# Patient Record
Sex: FEMALE | Race: WHITE | Employment: FULL TIME | URBAN - METROPOLITAN AREA
[De-identification: names, ages, dates, MRNs, and addresses within clinical notes are randomized per-mention and may not be internally consistent; named-entity substitution may affect disease eponyms.]

---

## 2017-01-10 ENCOUNTER — TELEPHONE (OUTPATIENT)
Dept: INTERNAL MEDICINE CLINIC | Facility: CLINIC | Age: 62
End: 2017-01-10

## 2017-01-10 NOTE — TELEPHONE ENCOUNTER
Pt cancelled her physical on 3/20. She is having surgery on that day at [facility]. She said that all the testing and pre-op will be done there. Does  want to see her for a physical before or after the surgery? Pt is aware that  is not here today.

## 2017-01-11 NOTE — TELEPHONE ENCOUNTER
Discussed with patient. She is going to have a knee revision surgery with Dr. Kathleen Jeter at [facility] on March 20. I have recommended that she come in to see me about 2 or 3 weeks at a time so that I can check her and her blood pressure.   I will hold off on blood

## 2017-02-24 ENCOUNTER — TELEPHONE (OUTPATIENT)
Dept: INTERNAL MEDICINE CLINIC | Facility: CLINIC | Age: 62
End: 2017-02-24

## 2017-02-24 NOTE — TELEPHONE ENCOUNTER
ZBIGNIEW Mayorga, pre-procedure RN at [facility] to call back-what surgery will patient have and when is she scheduled to have the surgery. Last EKG is from 3/24/16 and patient is scheduled to see Estefania Trejo on 3/6/17.  Per 1/10/17 encounter-patient having knee re

## 2017-02-24 NOTE — TELEPHONE ENCOUNTER
Collette called back, states patient is having the knee revision surgery on 3/20/2017 at [facility] and all the pre-op testing is ordered by their doctors and done at [facility] but they just need a previous EKG for comparison.  Patients last EKG (3/24/2016) faxed to Col

## 2017-03-06 ENCOUNTER — OFFICE VISIT (OUTPATIENT)
Dept: INTERNAL MEDICINE CLINIC | Facility: CLINIC | Age: 62
End: 2017-03-06

## 2017-03-06 VITALS
SYSTOLIC BLOOD PRESSURE: 126 MMHG | TEMPERATURE: 98 F | HEART RATE: 76 BPM | DIASTOLIC BLOOD PRESSURE: 76 MMHG | BODY MASS INDEX: 21.67 KG/M2 | WEIGHT: 131.63 LBS | HEIGHT: 65.5 IN | OXYGEN SATURATION: 98 %

## 2017-03-06 DIAGNOSIS — Z96.652 HISTORY OF TOTAL LEFT KNEE REPLACEMENT: ICD-10-CM

## 2017-03-06 DIAGNOSIS — M85.88 OSTEOPENIA OF SPINE: ICD-10-CM

## 2017-03-06 DIAGNOSIS — G47.30 SLEEP APNEA, UNSPECIFIED TYPE: ICD-10-CM

## 2017-03-06 DIAGNOSIS — M15.9 PRIMARY OSTEOARTHRITIS INVOLVING MULTIPLE JOINTS: ICD-10-CM

## 2017-03-06 DIAGNOSIS — E78.9 BORDERLINE HIGH CHOLESTEROL: ICD-10-CM

## 2017-03-06 DIAGNOSIS — I10 ESSENTIAL HYPERTENSION: Primary | ICD-10-CM

## 2017-03-06 PROCEDURE — 99214 OFFICE O/P EST MOD 30 MIN: CPT | Performed by: INTERNAL MEDICINE

## 2017-03-06 PROCEDURE — 99212 OFFICE O/P EST SF 10 MIN: CPT | Performed by: INTERNAL MEDICINE

## 2017-03-06 RX ORDER — DIFLORASONE DIACETATE 0.5 MG/G
CREAM TOPICAL 3 TIMES DAILY
COMMUNITY
End: 2017-09-07

## 2017-03-06 RX ORDER — SODIUM PHOSPHATE,MONO-DIBASIC 19G-7G/118
1 ENEMA (ML) RECTAL 2 TIMES DAILY
COMMUNITY

## 2017-03-06 NOTE — PATIENT INSTRUCTIONS
1.  Patient is to continue her current diet, medications and activity. 2.  Patient appears to be doing well medically and should be able to tolerate her proposed surgery.   3.  Patient will have her physician at [facility] send me copies of he

## 2017-03-06 NOTE — PROGRESS NOTES
Homer Huerta is a 57year old female. Patient presents with: Follow - Up  Hypertension  Arthritis    HPI:   Patient presents with: Follow - Up  Hypertension  Arthritis    Pt feels well.   Pt is going to have a left knee revision with Dr Renée Rodriguez in the near otherwise  RESPIRATORY:No cough or SOB  CARDIOVASCULAR: No chest pain  GI: No abdominal pain, nausea, vomiting, diarrhea, or constipation  :No Urinary complaints  EXT:No complaints of pain or swelling in patient's legs    EXAM:   /76 mmHg  Pulse 76 The patient notes that her left knee has become unstable. She has been told that the plastic component of the knee has worn out. Patient will have a revision of her left total knee replacement with , at [facility], on March 20.       The patient indicat

## 2017-07-19 RX ORDER — DILTIAZEM HYDROCHLORIDE 240 MG/1
CAPSULE, COATED, EXTENDED RELEASE ORAL
Qty: 90 CAPSULE | Refills: 3 | Status: SHIPPED | OUTPATIENT
Start: 2017-07-19 | End: 2018-03-15 | Stop reason: ALTCHOICE

## 2017-07-19 RX ORDER — TRIAMTERENE AND HYDROCHLOROTHIAZIDE 37.5; 25 MG/1; MG/1
CAPSULE ORAL
Qty: 90 CAPSULE | Refills: 3 | Status: SHIPPED | OUTPATIENT
Start: 2017-07-19 | End: 2018-11-14

## 2017-07-19 RX ORDER — LOSARTAN POTASSIUM 50 MG/1
TABLET ORAL
Qty: 90 TABLET | Refills: 3 | Status: SHIPPED | OUTPATIENT
Start: 2017-07-19 | End: 2018-08-09

## 2017-08-31 ENCOUNTER — APPOINTMENT (OUTPATIENT)
Dept: LAB | Age: 62
End: 2017-08-31
Attending: INTERNAL MEDICINE
Payer: COMMERCIAL

## 2017-08-31 PROCEDURE — 80061 LIPID PANEL: CPT | Performed by: INTERNAL MEDICINE

## 2017-08-31 PROCEDURE — 80053 COMPREHEN METABOLIC PANEL: CPT | Performed by: INTERNAL MEDICINE

## 2017-08-31 PROCEDURE — 85025 COMPLETE CBC W/AUTO DIFF WBC: CPT | Performed by: INTERNAL MEDICINE

## 2017-08-31 PROCEDURE — 84443 ASSAY THYROID STIM HORMONE: CPT | Performed by: INTERNAL MEDICINE

## 2017-08-31 PROCEDURE — 82306 VITAMIN D 25 HYDROXY: CPT | Performed by: INTERNAL MEDICINE

## 2017-08-31 PROCEDURE — 81001 URINALYSIS AUTO W/SCOPE: CPT | Performed by: INTERNAL MEDICINE

## 2017-09-01 ENCOUNTER — TELEPHONE (OUTPATIENT)
Dept: INTERNAL MEDICINE CLINIC | Facility: CLINIC | Age: 62
End: 2017-09-01

## 2017-09-01 NOTE — TELEPHONE ENCOUNTER
To Dr. Kati Singh - would you please reivew pt's u/a? Spoke with pt who denies all UTI sx: fever/chills/dysuria/urgency/pressure, etc.  Pt states she does have frequency but this is long-standing.

## 2017-09-07 ENCOUNTER — OFFICE VISIT (OUTPATIENT)
Dept: INTERNAL MEDICINE CLINIC | Facility: CLINIC | Age: 62
End: 2017-09-07

## 2017-09-07 VITALS
HEIGHT: 65.5 IN | WEIGHT: 130 LBS | TEMPERATURE: 98 F | HEART RATE: 80 BPM | RESPIRATION RATE: 18 BRPM | SYSTOLIC BLOOD PRESSURE: 124 MMHG | BODY MASS INDEX: 21.4 KG/M2 | DIASTOLIC BLOOD PRESSURE: 80 MMHG | OXYGEN SATURATION: 97 %

## 2017-09-07 DIAGNOSIS — N28.9 RENAL INSUFFICIENCY: ICD-10-CM

## 2017-09-07 DIAGNOSIS — E78.9 BORDERLINE HIGH CHOLESTEROL: ICD-10-CM

## 2017-09-07 DIAGNOSIS — M15.9 PRIMARY OSTEOARTHRITIS INVOLVING MULTIPLE JOINTS: ICD-10-CM

## 2017-09-07 DIAGNOSIS — I10 ESSENTIAL HYPERTENSION: ICD-10-CM

## 2017-09-07 DIAGNOSIS — M85.88 OSTEOPENIA OF SPINE: ICD-10-CM

## 2017-09-07 DIAGNOSIS — G47.30 SLEEP APNEA, UNSPECIFIED TYPE: ICD-10-CM

## 2017-09-07 DIAGNOSIS — Z00.00 ANNUAL PHYSICAL EXAM: Primary | ICD-10-CM

## 2017-09-07 DIAGNOSIS — Z96.652 HISTORY OF TOTAL LEFT KNEE REPLACEMENT: ICD-10-CM

## 2017-09-07 PROCEDURE — 99396 PREV VISIT EST AGE 40-64: CPT | Performed by: INTERNAL MEDICINE

## 2017-09-07 PROCEDURE — 99212 OFFICE O/P EST SF 10 MIN: CPT | Performed by: INTERNAL MEDICINE

## 2017-09-07 PROCEDURE — 99214 OFFICE O/P EST MOD 30 MIN: CPT | Performed by: INTERNAL MEDICINE

## 2017-09-07 RX ORDER — CIPROFLOXACIN 500 MG/1
500 TABLET, FILM COATED ORAL 2 TIMES DAILY
Qty: 20 TABLET | Refills: 0 | Status: SHIPPED | OUTPATIENT
Start: 2017-09-07 | End: 2017-09-17

## 2017-09-07 RX ORDER — PANTOPRAZOLE SODIUM 40 MG/1
40 TABLET, DELAYED RELEASE ORAL
COMMUNITY
Start: 2017-03-21 | End: 2017-09-07

## 2017-09-07 RX ORDER — ASPIRIN 325 MG
325 TABLET, DELAYED RELEASE (ENTERIC COATED) ORAL
COMMUNITY
Start: 2017-03-20 | End: 2017-09-07

## 2017-09-07 RX ORDER — LOSARTAN POTASSIUM 50 MG/1
TABLET ORAL
COMMUNITY
Start: 2016-01-16 | End: 2017-09-07

## 2017-09-07 RX ORDER — TRIAMTERENE AND HYDROCHLOROTHIAZIDE 37.5; 25 MG/1; MG/1
CAPSULE ORAL
COMMUNITY
Start: 2016-01-16 | End: 2017-09-07

## 2017-09-07 RX ORDER — DOCUSATE SODIUM 100 MG/1
100 CAPSULE, LIQUID FILLED ORAL
COMMUNITY
Start: 2017-03-20 | End: 2017-09-07

## 2017-09-07 RX ORDER — DIFLORASONE DIACETATE 0.5 MG/G
1 OINTMENT TOPICAL
COMMUNITY
End: 2018-03-15 | Stop reason: ALTCHOICE

## 2017-09-07 RX ORDER — HYDROCODONE BITARTRATE AND ACETAMINOPHEN 10; 325 MG/1; MG/1
1-2 TABLET ORAL
COMMUNITY
Start: 2017-03-20 | End: 2017-09-07

## 2017-09-07 RX ORDER — TOBRAMYCIN 3 MG/ML
SOLUTION/ DROPS OPHTHALMIC
COMMUNITY
Start: 2015-12-20 | End: 2017-09-07

## 2017-09-07 RX ORDER — DILTIAZEM HYDROCHLORIDE 240 MG/1
CAPSULE, COATED, EXTENDED RELEASE ORAL
COMMUNITY
Start: 2016-01-16 | End: 2017-09-07

## 2017-09-07 NOTE — PATIENT INSTRUCTIONS
1.  Patient is to continue her current diet, medications and activity. 2.  I will plan to see the patient back in 6 months with blood tests which include a BMP, lipid panel, AST and ALT.   3.  I have sent a prescription for Cipro for the patient and also a

## 2017-09-07 NOTE — PROGRESS NOTES
HPI:   Anabel Hernandez is a 57year old female who presents for a complete physical exam.  Mrs. Stephanie Cui is a 75-year-old white female who was seen by me on September 7, 2017 for her annual physical examination. At the time of examination Mrs. Godoy Rashid was Product (PROBIOTIC & ACIDOPHILUS EX ST OR) Take 1 tablet by mouth 2 (two) times daily. Disp:  Rfl:    Coenzyme Q10 (COQ10 OR) Take 1 tablet by mouth daily.    Disp:  Rfl:    Calcium Carb-Cholecalciferol ([employer] OYSTER SHELL CALCIUM+VIT D) 500-125 MG-UNIT Oral well developed, well nourished,in no apparent distress  SKIN: no rashes,no suspicious lesions  HEENT: atraumatic, normocephalic, normal oropharynx, normal TM's  EYES:PERRLA, EOMI,conjunctiva are clear, sclerae are nonicteric  NECK: supple, no cervical or s Micah Calix or at her pharmacy. 2. Essential hypertension  Doing well.  CPM.  As above.     3. Primary osteoarthritis involving multiple joints  Doing well.  CPM.  Patient is done well with her recent revision of her left total knee r

## 2017-09-26 ENCOUNTER — TELEPHONE (OUTPATIENT)
Dept: INTERNAL MEDICINE CLINIC | Facility: CLINIC | Age: 62
End: 2017-09-26

## 2017-09-26 NOTE — TELEPHONE ENCOUNTER
Patient will be travelling for about one month   Would like to take written RX/s with her for her 3 blood pressure medications   Leaves on Friday 9/29 and requests to  on Thurs 9.28    Please call pt when ready [phone]    Message 1 of 2 - Vincenzo Lee

## 2017-11-06 ENCOUNTER — TELEPHONE (OUTPATIENT)
Dept: INTERNAL MEDICINE CLINIC | Facility: CLINIC | Age: 62
End: 2017-11-06

## 2017-11-06 NOTE — TELEPHONE ENCOUNTER
[facility] is requesting a prescription for Triamterene - HCTZ 37.5-25 mg tablets. Capsules are on backorder.      Tasked to rx

## 2017-12-08 ENCOUNTER — TELEPHONE (OUTPATIENT)
Dept: INTERNAL MEDICINE CLINIC | Facility: CLINIC | Age: 62
End: 2017-12-08

## 2017-12-08 NOTE — TELEPHONE ENCOUNTER
Pt. Is calling with a medication question regarding Diltiazem, she is currently taking 240 mg, she is signing up for Medicare and they don't cover that dosage. She wants to know if the dosage could be changed to a dosage that would be covered.  Ph. # [phone]

## 2017-12-12 NOTE — TELEPHONE ENCOUNTER
Tatyana with patient. Her Medicare drug plan will not cover the dosage of the diltiazem that she is currently on which is 240 mg.  I told the patient will consider lowering her dose is 180 mg which is covered.   She will let me know if she needs the [facility]

## 2018-02-26 ENCOUNTER — OFFICE VISIT (OUTPATIENT)
Dept: DERMATOLOGY CLINIC | Facility: CLINIC | Age: 63
End: 2018-02-26

## 2018-02-26 DIAGNOSIS — L30.9 DERMATITIS: Primary | ICD-10-CM

## 2018-02-26 DIAGNOSIS — D23.60 BENIGN NEOPLASM OF SKIN OF UPPER LIMB, INCLUDING SHOULDER, UNSPECIFIED LATERALITY: ICD-10-CM

## 2018-02-26 DIAGNOSIS — D23.5 BENIGN NEOPLASM OF SKIN OF TRUNK, EXCEPT SCROTUM: ICD-10-CM

## 2018-02-26 DIAGNOSIS — D23.30 BENIGN NEOPLASM OF SKIN OF FACE: ICD-10-CM

## 2018-02-26 DIAGNOSIS — D23.70 BENIGN NEOPLASM OF SKIN OF LOWER LIMB, INCLUDING HIP, UNSPECIFIED LATERALITY: ICD-10-CM

## 2018-02-26 DIAGNOSIS — D23.4 BENIGN NEOPLASM OF SCALP AND SKIN OF NECK: ICD-10-CM

## 2018-02-26 PROCEDURE — G0463 HOSPITAL OUTPT CLINIC VISIT: HCPCS | Performed by: DERMATOLOGY

## 2018-02-26 PROCEDURE — 99202 OFFICE O/P NEW SF 15 MIN: CPT | Performed by: DERMATOLOGY

## 2018-02-26 RX ORDER — TRIAMCINOLONE ACETONIDE 5 MG/G
CREAM TOPICAL
Qty: 60 G | Refills: 1 | Status: SHIPPED | OUTPATIENT
Start: 2018-02-26 | End: 2022-02-04

## 2018-03-11 NOTE — PROGRESS NOTES
Saravanan Gordillo is a 70year old female. HPI:     CC:  Patient presents with:  Lesion: Pt presenting today with dry crusty patches to face and right shin that are itchy and tends to get reallt red and irriated at times.  Pt denies a personal/family hx of SC. Take 1 tablet by mouth daily. Disp:  Rfl:    Calcium Carb-Cholecalciferol ([employer] OYSTER SHELL CALCIUM+VIT D) 500-125 MG-UNIT Oral Tab Take 1 tablet by mouth 2 (two) times daily. Disp:  Rfl:    Aspirin (ASPIR-81 OR) Take 1 tablet by mouth daily.    Disp: complaints. History, medications, allergies reviewed as noted. Physical Examination:     Well-developed well-nourished patient alert oriented in no acute distress.   Exam total-body performed, including scalp, head, neck, face,nails, hair, exte next several weeks. Await clinical response to above therapies. Multiple lentigos keratoses reassurance no suspicious lesions. No significant changes  Please refer to map for specific lesions. See additional diagnoses.   Pros cons of various therapie

## 2018-03-14 ENCOUNTER — APPOINTMENT (OUTPATIENT)
Dept: LAB | Age: 63
End: 2018-03-14
Attending: INTERNAL MEDICINE
Payer: MEDICARE

## 2018-03-14 ENCOUNTER — TELEPHONE (OUTPATIENT)
Dept: INTERNAL MEDICINE CLINIC | Facility: CLINIC | Age: 63
End: 2018-03-14

## 2018-03-14 DIAGNOSIS — E78.5 HYPERLIPIDEMIA, UNSPECIFIED HYPERLIPIDEMIA TYPE: ICD-10-CM

## 2018-03-14 DIAGNOSIS — I10 HYPERTENSION, UNSPECIFIED TYPE: ICD-10-CM

## 2018-03-14 DIAGNOSIS — I10 HYPERTENSION, UNSPECIFIED TYPE: Primary | ICD-10-CM

## 2018-03-14 LAB
ALT SERPL-CCNC: 14 U/L (ref 14–54)
ANION GAP SERPL CALC-SCNC: 8 MMOL/L (ref 0–18)
AST SERPL-CCNC: 19 U/L (ref 15–41)
BUN SERPL-MCNC: 16 MG/DL (ref 8–20)
BUN/CREAT SERPL: 18.4 (ref 10–20)
CALCIUM SERPL-MCNC: 9.4 MG/DL (ref 8.5–10.5)
CHLORIDE SERPL-SCNC: 104 MMOL/L (ref 95–110)
CHOLEST SERPL-MCNC: 188 MG/DL (ref 110–200)
CO2 SERPL-SCNC: 26 MMOL/L (ref 22–32)
CREAT SERPL-MCNC: 0.87 MG/DL (ref 0.5–1.5)
GLUCOSE SERPL-MCNC: 92 MG/DL (ref 70–99)
HDLC SERPL-MCNC: 57 MG/DL
LDLC SERPL CALC-MCNC: 115 MG/DL (ref 0–99)
NONHDLC SERPL-MCNC: 131 MG/DL
OSMOLALITY UR CALC.SUM OF ELEC: 287 MOSM/KG (ref 275–295)
POTASSIUM SERPL-SCNC: 3.3 MMOL/L (ref 3.3–5.1)
SODIUM SERPL-SCNC: 138 MMOL/L (ref 136–144)
TRIGL SERPL-MCNC: 82 MG/DL (ref 1–149)

## 2018-03-14 PROCEDURE — 36415 COLL VENOUS BLD VENIPUNCTURE: CPT

## 2018-03-14 PROCEDURE — 80061 LIPID PANEL: CPT

## 2018-03-14 PROCEDURE — 80048 BASIC METABOLIC PNL TOTAL CA: CPT

## 2018-03-14 PROCEDURE — 84460 ALANINE AMINO (ALT) (SGPT): CPT

## 2018-03-14 PROCEDURE — 84450 TRANSFERASE (AST) (SGOT): CPT

## 2018-03-15 ENCOUNTER — OFFICE VISIT (OUTPATIENT)
Dept: INTERNAL MEDICINE CLINIC | Facility: CLINIC | Age: 63
End: 2018-03-15

## 2018-03-15 VITALS
WEIGHT: 134.63 LBS | HEIGHT: 65.5 IN | DIASTOLIC BLOOD PRESSURE: 86 MMHG | SYSTOLIC BLOOD PRESSURE: 140 MMHG | HEART RATE: 80 BPM | OXYGEN SATURATION: 98 % | BODY MASS INDEX: 22.16 KG/M2 | TEMPERATURE: 98 F

## 2018-03-15 DIAGNOSIS — M15.9 PRIMARY OSTEOARTHRITIS INVOLVING MULTIPLE JOINTS: ICD-10-CM

## 2018-03-15 DIAGNOSIS — E78.9 BORDERLINE HIGH CHOLESTEROL: ICD-10-CM

## 2018-03-15 DIAGNOSIS — N28.9 RENAL INSUFFICIENCY: ICD-10-CM

## 2018-03-15 DIAGNOSIS — M85.88 OSTEOPENIA OF SPINE: ICD-10-CM

## 2018-03-15 DIAGNOSIS — Z96.652 HISTORY OF TOTAL LEFT KNEE REPLACEMENT: ICD-10-CM

## 2018-03-15 DIAGNOSIS — I10 ESSENTIAL HYPERTENSION: Primary | ICD-10-CM

## 2018-03-15 DIAGNOSIS — G47.30 SLEEP APNEA, UNSPECIFIED TYPE: ICD-10-CM

## 2018-03-15 PROCEDURE — G0463 HOSPITAL OUTPT CLINIC VISIT: HCPCS | Performed by: INTERNAL MEDICINE

## 2018-03-15 PROCEDURE — 99214 OFFICE O/P EST MOD 30 MIN: CPT | Performed by: INTERNAL MEDICINE

## 2018-03-15 RX ORDER — AMLODIPINE BESYLATE 5 MG/1
5 TABLET ORAL DAILY
Qty: 90 TABLET | Refills: 3 | Status: SHIPPED | OUTPATIENT
Start: 2018-03-15 | End: 2019-04-24

## 2018-03-15 NOTE — PATIENT INSTRUCTIONS
1.  Patient is to continue her current diet, medication and activity. 2.  I will stop the patient's Cardizem 240 mg orally daily and switch her to Norvasc/amlodipine 5 mg orally daily.   3.  I will plan see the patient back in about 2 months to check to se

## 2018-03-15 NOTE — PROGRESS NOTES
Henry Daugherty is a 70year old female. Patient presents with:  Checkup: 6 month. gyne from [facility] ordered dexa scan and mammogram-scheduled for april 2. Medication Request: States medicare won't pay for current dose of diltiazem. New new dose for coverage. nodule 2005   • Osteoarthrosis, unspecified whether generalized or localized, unspecified site 2004   • Osteopenia 2003   • Unspecified essential hypertension       Social History:  Smoking status: Never Smoker revision last year. Patient been bothered by pain in her right knee. She is considering having surgery on this at some point in the future. Currently she and her  are planning to go on a bike trip in September.   She is not planning of surgery chapo

## 2018-04-02 ENCOUNTER — HOSPITAL ENCOUNTER (OUTPATIENT)
Dept: MAMMOGRAPHY | Facility: HOSPITAL | Age: 63
Discharge: HOME | End: 2018-04-02
Attending: OBSTETRICS & GYNECOLOGY
Payer: MEDICARE

## 2018-04-02 ENCOUNTER — HOSPITAL ENCOUNTER (OUTPATIENT)
Dept: BONE DENSITY | Facility: HOSPITAL | Age: 63
Discharge: HOME | End: 2018-04-02
Attending: OBSTETRICS & GYNECOLOGY
Payer: MEDICARE

## 2018-04-02 DIAGNOSIS — Z12.39 SCREENING FOR MALIGNANT NEOPLASM OF BREAST: ICD-10-CM

## 2018-04-02 DIAGNOSIS — M81.0 AGE-RELATED OSTEOPOROSIS WITHOUT CURRENT PATHOLOGICAL FRACTURE: ICD-10-CM

## 2018-04-02 PROCEDURE — 77067 SCR MAMMO BI INCL CAD: CPT | Performed by: OBSTETRICS & GYNECOLOGY

## 2018-04-02 PROCEDURE — 77080 DXA BONE DENSITY AXIAL: CPT | Performed by: OBSTETRICS & GYNECOLOGY

## 2018-04-10 ENCOUNTER — TELEPHONE (OUTPATIENT)
Dept: INTERNAL MEDICINE CLINIC | Facility: CLINIC | Age: 63
End: 2018-04-10

## 2018-04-10 NOTE — TELEPHONE ENCOUNTER
Please call pt and notify her that her recent Mammogram has turned out well. No sign of cancer was noted. I will route this to nursing.   Thank you!!

## 2018-06-24 ENCOUNTER — TELEPHONE (OUTPATIENT)
Dept: INTERNAL MEDICINE CLINIC | Facility: CLINIC | Age: 63
End: 2018-06-24

## 2018-06-24 DIAGNOSIS — R53.83 FATIGUE, UNSPECIFIED TYPE: ICD-10-CM

## 2018-06-24 DIAGNOSIS — E55.9 VITAMIN D DEFICIENCY: ICD-10-CM

## 2018-06-24 DIAGNOSIS — I10 ESSENTIAL HYPERTENSION: ICD-10-CM

## 2018-06-24 DIAGNOSIS — E78.9 BORDERLINE HIGH CHOLESTEROL: ICD-10-CM

## 2018-06-24 DIAGNOSIS — Z00.00 ANNUAL PHYSICAL EXAM: Primary | ICD-10-CM

## 2018-06-24 NOTE — TELEPHONE ENCOUNTER
Noted. Patient was here to see me last week but had to leave before I could see her. Patient will be due back to see me in September for her annual physical examination.   Please call the patient and notify her that I have placed orders for blood tests an

## 2018-06-25 NOTE — TELEPHONE ENCOUNTER
Left voicemail per HIPAA relaying the message along with instructions to fast for 12 hours prior to blood draw. Encouraged the patient to call back with any questions or concerns. Nothing further at this time.

## 2018-08-09 RX ORDER — LOSARTAN POTASSIUM 50 MG/1
TABLET ORAL
Qty: 90 TABLET | Refills: 2 | Status: SHIPPED | OUTPATIENT
Start: 2018-08-09 | End: 2019-05-08

## 2018-11-14 RX ORDER — TRIAMTERENE AND HYDROCHLOROTHIAZIDE 37.5; 25 MG/1; MG/1
CAPSULE ORAL
Qty: 90 CAPSULE | Refills: 0 | Status: SHIPPED | OUTPATIENT
Start: 2018-11-14 | End: 2019-02-09

## 2018-12-19 ENCOUNTER — LAB ENCOUNTER (OUTPATIENT)
Dept: LAB | Age: 63
End: 2018-12-19
Attending: INTERNAL MEDICINE
Payer: MEDICARE

## 2018-12-19 DIAGNOSIS — E55.9 VITAMIN D DEFICIENCY: ICD-10-CM

## 2018-12-19 DIAGNOSIS — E78.9 BORDERLINE HIGH CHOLESTEROL: ICD-10-CM

## 2018-12-19 DIAGNOSIS — Z00.00 ANNUAL PHYSICAL EXAM: ICD-10-CM

## 2018-12-19 DIAGNOSIS — R53.83 FATIGUE, UNSPECIFIED TYPE: ICD-10-CM

## 2018-12-19 PROCEDURE — 85025 COMPLETE CBC W/AUTO DIFF WBC: CPT

## 2018-12-19 PROCEDURE — 36415 COLL VENOUS BLD VENIPUNCTURE: CPT

## 2018-12-19 PROCEDURE — 82306 VITAMIN D 25 HYDROXY: CPT

## 2018-12-19 PROCEDURE — 81001 URINALYSIS AUTO W/SCOPE: CPT

## 2018-12-19 PROCEDURE — 80061 LIPID PANEL: CPT

## 2018-12-19 PROCEDURE — 84443 ASSAY THYROID STIM HORMONE: CPT

## 2018-12-19 PROCEDURE — 80053 COMPREHEN METABOLIC PANEL: CPT

## 2018-12-21 ENCOUNTER — OFFICE VISIT (OUTPATIENT)
Dept: INTERNAL MEDICINE CLINIC | Facility: CLINIC | Age: 63
End: 2018-12-21
Payer: MEDICARE

## 2018-12-21 VITALS
OXYGEN SATURATION: 98 % | WEIGHT: 136 LBS | HEIGHT: 65.5 IN | BODY MASS INDEX: 22.39 KG/M2 | DIASTOLIC BLOOD PRESSURE: 76 MMHG | TEMPERATURE: 98 F | SYSTOLIC BLOOD PRESSURE: 116 MMHG | HEART RATE: 80 BPM

## 2018-12-21 DIAGNOSIS — I10 ESSENTIAL HYPERTENSION: Primary | ICD-10-CM

## 2018-12-21 DIAGNOSIS — N28.9 RENAL INSUFFICIENCY: ICD-10-CM

## 2018-12-21 DIAGNOSIS — E78.9 BORDERLINE HIGH CHOLESTEROL: ICD-10-CM

## 2018-12-21 DIAGNOSIS — Z96.652 HISTORY OF TOTAL LEFT KNEE REPLACEMENT: ICD-10-CM

## 2018-12-21 DIAGNOSIS — M15.9 PRIMARY OSTEOARTHRITIS INVOLVING MULTIPLE JOINTS: ICD-10-CM

## 2018-12-21 DIAGNOSIS — G47.30 SLEEP APNEA, UNSPECIFIED TYPE: ICD-10-CM

## 2018-12-21 DIAGNOSIS — M85.88 OSTEOPENIA OF SPINE: ICD-10-CM

## 2018-12-21 PROCEDURE — 90670 PCV13 VACCINE IM: CPT | Performed by: INTERNAL MEDICINE

## 2018-12-21 PROCEDURE — G0463 HOSPITAL OUTPT CLINIC VISIT: HCPCS | Performed by: INTERNAL MEDICINE

## 2018-12-21 PROCEDURE — G0009 ADMIN PNEUMOCOCCAL VACCINE: HCPCS | Performed by: INTERNAL MEDICINE

## 2018-12-21 PROCEDURE — 99214 OFFICE O/P EST MOD 30 MIN: CPT | Performed by: INTERNAL MEDICINE

## 2018-12-21 RX ORDER — ALENDRONATE SODIUM 70 MG/1
1 TABLET ORAL WEEKLY
Refills: 3 | COMMUNITY
Start: 2018-10-21

## 2018-12-21 RX ORDER — AMOXICILLIN 500 MG/1
CAPSULE ORAL
Qty: 4 CAPSULE | Refills: 3 | Status: ON HOLD | OUTPATIENT
Start: 2018-12-21 | End: 2019-11-12

## 2018-12-21 NOTE — PATIENT INSTRUCTIONS
1.  Patient is to continue her current diet, medication and activity. 2.  I will plan to see the patient back in 6 months with blood tests which will include BMP, lipid panel, AST and ALT. This will actually be her annual physical examination.   We will d

## 2018-12-21 NOTE — PROGRESS NOTES
Annika Kaplan is a 70year old female. Patient presents with:  Checkup: Feels well. She would like a script for antibiotics prior to next dental appointment. Hypertension  Arthritis    HPI:   Patient presents with:  Checkup: Feels well.  She would like a (NORVASC) 5 MG Oral Tab Take 1 tablet (5 mg total) by mouth daily.  Disp: 90 tablet Rfl: 3   triamcinolone acetonide 0.5 % External Cream Use bid as directed Disp: 60 g Rfl: 1   Glucosamine-Chondroitin 500-400 MG Oral Cap Take 1 tablet by mouth 2 (two) time breast masses are noted. LUNGS: clear to auscultation  CARDIO: RRR, normal S1S2, without murmur   GI:Protuberant, BS are present, no organomegaly or palpable masses  EXTREMITIES: no edema. All peripheral pulses are intact.   Patient has good range of lorrie Patient's BUN is 16, creatinine is 0.94 and GFR is greater than 60. Patient's renal function is now normal.      The patient indicates understanding of these issues and agrees to the plan.   The patient is asked to return in 6 months with blood tests and u

## 2019-02-10 RX ORDER — TRIAMTERENE AND HYDROCHLOROTHIAZIDE 37.5; 25 MG/1; MG/1
CAPSULE ORAL
Qty: 90 CAPSULE | Refills: 1 | Status: SHIPPED | OUTPATIENT
Start: 2019-02-10 | End: 2019-08-14

## 2019-04-24 RX ORDER — AMLODIPINE BESYLATE 5 MG/1
5 TABLET ORAL DAILY
Qty: 90 TABLET | Refills: 3 | Status: SHIPPED | OUTPATIENT
Start: 2019-04-24 | End: 2020-05-01

## 2019-05-08 RX ORDER — LOSARTAN POTASSIUM 50 MG/1
50 TABLET ORAL DAILY
Qty: 90 TABLET | Refills: 3 | Status: SHIPPED | OUTPATIENT
Start: 2019-05-08 | End: 2020-06-08

## 2019-05-20 ENCOUNTER — TELEPHONE (OUTPATIENT)
Dept: INTERNAL MEDICINE CLINIC | Facility: CLINIC | Age: 64
End: 2019-05-20

## 2019-05-20 NOTE — TELEPHONE ENCOUNTER
Patient  Senia Nicole calling asking if patient should get measles vaccine.     Best number to call back at [phone]

## 2019-05-21 NOTE — TELEPHONE ENCOUNTER
Phone call to patient and patient's  and message left. People grow up in the 46s most likely had the measles as was very contagious at that time. It would be very hard to get to adulthood from that time without having had the measles.   If they fel

## 2019-06-24 ENCOUNTER — TELEPHONE (OUTPATIENT)
Dept: INTERNAL MEDICINE CLINIC | Facility: CLINIC | Age: 64
End: 2019-06-24

## 2019-06-24 DIAGNOSIS — R35.0 URINARY FREQUENCY: Primary | ICD-10-CM

## 2019-06-24 NOTE — TELEPHONE ENCOUNTER
[facility]-----    Spoke to pt who states she has urinary frequency and urgency.      UA/CS ordered per MD protocol

## 2019-06-25 ENCOUNTER — APPOINTMENT (OUTPATIENT)
Dept: LAB | Age: 64
End: 2019-06-25
Attending: INTERNAL MEDICINE
Payer: MEDICARE

## 2019-06-25 DIAGNOSIS — E78.9 BORDERLINE HIGH CHOLESTEROL: ICD-10-CM

## 2019-06-25 DIAGNOSIS — I10 ESSENTIAL HYPERTENSION: ICD-10-CM

## 2019-06-25 DIAGNOSIS — R35.0 URINARY FREQUENCY: ICD-10-CM

## 2019-06-25 DIAGNOSIS — N28.9 RENAL INSUFFICIENCY: ICD-10-CM

## 2019-06-25 PROCEDURE — 84450 TRANSFERASE (AST) (SGOT): CPT

## 2019-06-25 PROCEDURE — 84460 ALANINE AMINO (ALT) (SGPT): CPT

## 2019-06-25 PROCEDURE — 80061 LIPID PANEL: CPT

## 2019-06-25 PROCEDURE — 80048 BASIC METABOLIC PNL TOTAL CA: CPT

## 2019-06-25 PROCEDURE — 81003 URINALYSIS AUTO W/O SCOPE: CPT

## 2019-06-25 PROCEDURE — 36415 COLL VENOUS BLD VENIPUNCTURE: CPT

## 2019-06-27 ENCOUNTER — OFFICE VISIT (OUTPATIENT)
Dept: INTERNAL MEDICINE CLINIC | Facility: CLINIC | Age: 64
End: 2019-06-27
Payer: MEDICARE

## 2019-06-27 VITALS
HEART RATE: 84 BPM | OXYGEN SATURATION: 99 % | HEIGHT: 65.5 IN | RESPIRATION RATE: 16 BRPM | WEIGHT: 138 LBS | TEMPERATURE: 98 F | BODY MASS INDEX: 22.72 KG/M2 | SYSTOLIC BLOOD PRESSURE: 130 MMHG | DIASTOLIC BLOOD PRESSURE: 80 MMHG

## 2019-06-27 DIAGNOSIS — M15.9 PRIMARY OSTEOARTHRITIS INVOLVING MULTIPLE JOINTS: ICD-10-CM

## 2019-06-27 DIAGNOSIS — M85.88 OSTEOPENIA OF SPINE: ICD-10-CM

## 2019-06-27 DIAGNOSIS — J01.90 ACUTE NON-RECURRENT SINUSITIS, UNSPECIFIED LOCATION: ICD-10-CM

## 2019-06-27 DIAGNOSIS — J40 BRONCHITIS: ICD-10-CM

## 2019-06-27 DIAGNOSIS — Z96.652 HISTORY OF TOTAL LEFT KNEE REPLACEMENT: ICD-10-CM

## 2019-06-27 DIAGNOSIS — Z00.00 ANNUAL PHYSICAL EXAM: Primary | ICD-10-CM

## 2019-06-27 DIAGNOSIS — N28.9 RENAL INSUFFICIENCY: ICD-10-CM

## 2019-06-27 DIAGNOSIS — I10 ESSENTIAL HYPERTENSION: ICD-10-CM

## 2019-06-27 DIAGNOSIS — E78.9 BORDERLINE HIGH CHOLESTEROL: ICD-10-CM

## 2019-06-27 DIAGNOSIS — N18.30 STAGE 3 CHRONIC KIDNEY DISEASE (HCC): ICD-10-CM

## 2019-06-27 PROCEDURE — 93010 ELECTROCARDIOGRAM REPORT: CPT | Performed by: INTERNAL MEDICINE

## 2019-06-27 PROCEDURE — G0463 HOSPITAL OUTPT CLINIC VISIT: HCPCS | Performed by: INTERNAL MEDICINE

## 2019-06-27 PROCEDURE — 99214 OFFICE O/P EST MOD 30 MIN: CPT | Performed by: INTERNAL MEDICINE

## 2019-06-27 PROCEDURE — G0439 PPPS, SUBSEQ VISIT: HCPCS | Performed by: INTERNAL MEDICINE

## 2019-06-27 PROCEDURE — 93005 ELECTROCARDIOGRAM TRACING: CPT | Performed by: INTERNAL MEDICINE

## 2019-06-27 RX ORDER — AZITHROMYCIN 250 MG/1
TABLET, FILM COATED ORAL
Qty: 6 TABLET | Refills: 1 | Status: SHIPPED | OUTPATIENT
Start: 2019-06-27 | End: 2019-10-16 | Stop reason: ALTCHOICE

## 2019-06-27 NOTE — PROGRESS NOTES
HPI:   Markus Eaton is a 75year old female who was seen by me on June 27, 2019 for her Medicare annual physical examination. At the time of examination Mrs. Jim George was feeling well. She notes that she has had a cough for the past 2 weeks.   She describ CALCIUM+VIT D) 500-125 MG-UNIT Oral Tab Take 1 tablet by mouth 2 (two) times daily. Disp:  Rfl:    Aspirin (ASPIR-81 OR) Take 1 tablet by mouth daily.    Disp:  Rfl:    amoxicillin 500 MG Oral Cap 500 mg  #4   Sig-take 4 tablets (2 g) orally 1 hour prior GENERAL: well developed, well nourished,in no apparent distress  SKIN: no rashes,no suspicious lesions  HEENT: atraumatic, normocephalic, normal oropharynx, normal TM's  EYES:PERRLA, EOMI,conjunctiva are clear, sclerae are nonicteric  NECK: supple, no CPM.    5. Borderline high cholesterol  Well.  CPM.  Patient's recent lipid panel had a cholesterol 191, triglycerides were 143, HDL cholesterol is 55 and LDL cholesterol was 107. Patient's AST was 17 and ALT was 22. CPM.    - LIPID PANEL;  Future  - AST Hearing Problems?: Yes(hearing aids)     Functional Status     Hearing Problems?: Yes(hearing aids)    Vision Problems? : Yes(contacts)    Difficulty walking?: No    Difficulty dressing or bathing?: No    Problems wit Dexascan Every two years Last Dexa Scan:   XR DEXA BONE DENSITOMETRY (CPT=77080) 04/02/2018    No flowsheet data found.    Pap and Pelvic      Pap: Every 3 yrs age 21-65 or Pap+HPV every 5 yrs age 33-67, age 72 and older at high risk   There are no preventi Value   06/25/2019 107 (H)     LDL-CHOLESTEROL (mg/dL (calc))   Date Value   01/16/2012 123    No flowsheet data found. Dilated Eye exam  Annually No flowsheet data found. No flowsheet data found.     COPD      Spirometry Testing Annually No results fo

## 2019-06-27 NOTE — PATIENT INSTRUCTIONS
1.  Patient is to continue her current diet, medication and activity. 2.  I will place the patient on Zithromax for her current cough and bronchitis. 3.  Patient may use Robitussin or Mucinex for the cough.   4.  I will plan to see the patient back in 3 m

## 2019-07-25 ENCOUNTER — HOSPITAL ENCOUNTER (OUTPATIENT)
Dept: MAMMOGRAPHY | Age: 64
Discharge: HOME | End: 2019-07-25
Attending: OBSTETRICS & GYNECOLOGY
Payer: MEDICARE

## 2019-07-25 DIAGNOSIS — Z12.31 ENCOUNTER FOR SCREENING MAMMOGRAM FOR MALIGNANT NEOPLASM OF BREAST: ICD-10-CM

## 2019-07-25 PROCEDURE — 77067 SCR MAMMO BI INCL CAD: CPT | Performed by: OBSTETRICS & GYNECOLOGY

## 2019-07-25 PROCEDURE — 77063 BREAST TOMOSYNTHESIS BI: CPT | Performed by: OBSTETRICS & GYNECOLOGY

## 2019-08-14 RX ORDER — TRIAMTERENE AND HYDROCHLOROTHIAZIDE 37.5; 25 MG/1; MG/1
CAPSULE ORAL
Qty: 90 CAPSULE | Refills: 3 | Status: SHIPPED | OUTPATIENT
Start: 2019-08-14 | End: 2020-08-23

## 2019-10-16 ENCOUNTER — OFFICE VISIT (OUTPATIENT)
Dept: INTERNAL MEDICINE CLINIC | Facility: CLINIC | Age: 64
End: 2019-10-16
Payer: MEDICARE

## 2019-10-16 VITALS
HEIGHT: 66 IN | BODY MASS INDEX: 23.3 KG/M2 | WEIGHT: 145 LBS | DIASTOLIC BLOOD PRESSURE: 76 MMHG | SYSTOLIC BLOOD PRESSURE: 126 MMHG | TEMPERATURE: 98 F | HEART RATE: 80 BPM | OXYGEN SATURATION: 99 %

## 2019-10-16 DIAGNOSIS — M85.88 OSTEOPENIA OF SPINE: ICD-10-CM

## 2019-10-16 DIAGNOSIS — N18.30 STAGE 3 CHRONIC KIDNEY DISEASE (HCC): ICD-10-CM

## 2019-10-16 DIAGNOSIS — M15.9 PRIMARY OSTEOARTHRITIS INVOLVING MULTIPLE JOINTS: ICD-10-CM

## 2019-10-16 DIAGNOSIS — Z01.818 PREOPERATIVE GENERAL PHYSICAL EXAMINATION: Primary | ICD-10-CM

## 2019-10-16 DIAGNOSIS — G47.30 SLEEP APNEA, UNSPECIFIED TYPE: ICD-10-CM

## 2019-10-16 DIAGNOSIS — I10 ESSENTIAL HYPERTENSION: ICD-10-CM

## 2019-10-16 DIAGNOSIS — E78.9 BORDERLINE HIGH CHOLESTEROL: ICD-10-CM

## 2019-10-16 DIAGNOSIS — R53.83 FATIGUE, UNSPECIFIED TYPE: ICD-10-CM

## 2019-10-16 DIAGNOSIS — M17.11 PRIMARY OSTEOARTHRITIS OF RIGHT KNEE: ICD-10-CM

## 2019-10-16 DIAGNOSIS — Z96.652 HISTORY OF TOTAL LEFT KNEE REPLACEMENT: ICD-10-CM

## 2019-10-16 DIAGNOSIS — Z01.818 PREOPERATIVE CLEARANCE: ICD-10-CM

## 2019-10-16 PROCEDURE — G0463 HOSPITAL OUTPT CLINIC VISIT: HCPCS | Performed by: INTERNAL MEDICINE

## 2019-10-16 PROCEDURE — 99215 OFFICE O/P EST HI 40 MIN: CPT | Performed by: INTERNAL MEDICINE

## 2019-10-16 PROCEDURE — 93010 ELECTROCARDIOGRAM REPORT: CPT | Performed by: INTERNAL MEDICINE

## 2019-10-16 PROCEDURE — 93005 ELECTROCARDIOGRAM TRACING: CPT | Performed by: INTERNAL MEDICINE

## 2019-10-16 NOTE — PATIENT INSTRUCTIONS
1.  Patient is to continue her current diet, medication and activity. 2.  Patient appears stable medically today and should be able tolerate proposed surgery. 3.  I have placed orders in the system for preoperative lab work as requested by Dr. Chey Mcmullen.   4

## 2019-10-17 NOTE — PROGRESS NOTES
HPI:   Aurea Granado is a 75year old female who was seen by me today for a preoperative history and physical examination and medical clearance examination prior to undergoing a right total knee replacement with Dr. Oumou Ho on November 11, 2019.   Patient (PROBIOTIC & ACIDOPHILUS EX ST OR), Take 1 tablet by mouth 2 (two) times daily. , Disp: , Rfl:   Coenzyme Q10 (COQ10 OR), Take 1 tablet by mouth daily.   , Disp: , Rfl:   Calcium Carb-Cholecalciferol ([employer] OYSTER SHELL CALCIUM+VIT D) 500-125 MG-UNIT Oral Tab, 6\" (1.676 m)   Wt 145 lb (65.8 kg)   LMP  (LMP Unknown)   SpO2 99%   BMI 23.40 kg/m²     GENERAL: well developed, well nourished,in no apparent distress  SKIN: no rashes,no suspicious lesions  HEENT: atraumatic, normocephalic, normal oropharynx, normal TM COMP METABOLIC PANEL (14); Future  - CBC WITH DIFFERENTIAL WITH PLATELET; Future  - MSSA AND MRSA CULTURE SCREEN; Future  - XR CHEST AP/PA (1 VIEW) (CPT=71045); Future  - URINALYSIS WITH CULTURE REFLEX    3.  Essential hypertension  Patient's blood pressure

## 2019-10-18 ENCOUNTER — LAB ENCOUNTER (OUTPATIENT)
Dept: LAB | Age: 64
End: 2019-10-18
Attending: INTERNAL MEDICINE
Payer: MEDICARE

## 2019-10-18 ENCOUNTER — HOSPITAL ENCOUNTER (OUTPATIENT)
Dept: GENERAL RADIOLOGY | Age: 64
Discharge: HOME | End: 2019-10-18
Attending: INTERNAL MEDICINE
Payer: MEDICARE

## 2019-10-18 DIAGNOSIS — I10 ESSENTIAL HYPERTENSION: ICD-10-CM

## 2019-10-18 DIAGNOSIS — E78.9 BORDERLINE HIGH CHOLESTEROL: ICD-10-CM

## 2019-10-18 DIAGNOSIS — Z01.818 PREOPERATIVE CLEARANCE: ICD-10-CM

## 2019-10-18 DIAGNOSIS — N18.30 STAGE 3 CHRONIC KIDNEY DISEASE (HCC): ICD-10-CM

## 2019-10-18 DIAGNOSIS — R53.83 FATIGUE, UNSPECIFIED TYPE: ICD-10-CM

## 2019-10-18 PROCEDURE — 71045 X-RAY EXAM CHEST 1 VIEW: CPT | Performed by: INTERNAL MEDICINE

## 2019-10-18 PROCEDURE — 87081 CULTURE SCREEN ONLY: CPT

## 2019-10-18 PROCEDURE — 80053 COMPREHEN METABOLIC PANEL: CPT

## 2019-10-18 PROCEDURE — 81003 URINALYSIS AUTO W/O SCOPE: CPT | Performed by: INTERNAL MEDICINE

## 2019-10-18 PROCEDURE — 80061 LIPID PANEL: CPT

## 2019-10-18 PROCEDURE — 85025 COMPLETE CBC W/AUTO DIFF WBC: CPT

## 2019-10-18 PROCEDURE — 36415 COLL VENOUS BLD VENIPUNCTURE: CPT

## 2019-10-25 ENCOUNTER — TELEPHONE (OUTPATIENT)
Dept: INTERNAL MEDICINE CLINIC | Facility: CLINIC | Age: 64
End: 2019-10-25

## 2019-10-25 NOTE — TELEPHONE ENCOUNTER
I had called [facility] previously to ask for alternate fax #. Per instructions from Dr. Klever Armendariz- fax clearance to both [phone] & [phone]. Faxed successfully to [phone], multiple fails to [phone].      Faxed to number below per Dorothy's

## 2019-10-25 NOTE — TELEPHONE ENCOUNTER
Dorothy from [employer] called back with a fax.  # [phone] for clearance routed to clinical

## 2019-11-05 ENCOUNTER — LAB ENCOUNTER (OUTPATIENT)
Dept: LAB | Age: 64
End: 2019-11-05
Attending: ORTHOPAEDIC SURGERY
Payer: MEDICARE

## 2019-11-05 DIAGNOSIS — Z01.818 PRE-OP TESTING: ICD-10-CM

## 2019-11-05 PROCEDURE — 86850 RBC ANTIBODY SCREEN: CPT

## 2019-11-05 PROCEDURE — 86901 BLOOD TYPING SEROLOGIC RH(D): CPT

## 2019-11-05 PROCEDURE — 36415 COLL VENOUS BLD VENIPUNCTURE: CPT

## 2019-11-05 PROCEDURE — 86900 BLOOD TYPING SEROLOGIC ABO: CPT

## 2019-11-05 NOTE — DISCHARGE SUMMARY
Ortho Discharge Summary     Patient ID:  Nelson Burns  [id number]  45 year old  1/10/1955      Admit Date: 11/11/2019 11:24 AM    Discharge Date and Time: 11/12/2019 12:37 PM      Attending Physician: Wagner Zurita MD     Reason for admission: right kn

## 2019-11-11 ENCOUNTER — APPOINTMENT (OUTPATIENT)
Dept: GENERAL RADIOLOGY | Facility: HOSPITAL | Age: 64
End: 2019-11-11
Attending: PHYSICIAN ASSISTANT
Payer: MEDICARE

## 2019-11-11 ENCOUNTER — ANESTHESIA (OUTPATIENT)
Dept: SURGERY | Facility: HOSPITAL | Age: 64
End: 2019-11-11
Payer: MEDICARE

## 2019-11-11 ENCOUNTER — ANESTHESIA EVENT (OUTPATIENT)
Dept: SURGERY | Facility: HOSPITAL | Age: 64
End: 2019-11-11
Payer: MEDICARE

## 2019-11-11 ENCOUNTER — HOSPITAL ENCOUNTER (OUTPATIENT)
Facility: HOSPITAL | Age: 64
Discharge: HOME | End: 2019-11-12
Attending: ORTHOPAEDIC SURGERY | Admitting: ORTHOPAEDIC SURGERY
Payer: MEDICARE

## 2019-11-11 DIAGNOSIS — M17.11 PRIMARY OSTEOARTHRITIS OF RIGHT KNEE: ICD-10-CM

## 2019-11-11 DIAGNOSIS — Z01.818 PRE-OP TESTING: Primary | ICD-10-CM

## 2019-11-11 PROBLEM — Z96.659 S/P TOTAL KNEE ARTHROPLASTY: Status: ACTIVE | Noted: 2019-11-11

## 2019-11-11 PROCEDURE — 0SRC0J9 REPLACEMENT OF RIGHT KNEE JOINT WITH SYNTHETIC SUBSTITUTE, CEMENTED, OPEN APPROACH: ICD-10-PCS | Performed by: ORTHOPAEDIC SURGERY

## 2019-11-11 PROCEDURE — 73560 X-RAY EXAM OF KNEE 1 OR 2: CPT | Performed by: PHYSICIAN ASSISTANT

## 2019-11-11 DEVICE — TKA CAP, PRIMARY W/O STEM EXT: Type: IMPLANTABLE DEVICE | Status: FUNCTIONAL

## 2019-11-11 DEVICE — BP TIB 5- STRL KNEE R NPOR DJO: Type: IMPLANTABLE DEVICE | Site: KNEE | Status: FUNCTIONAL

## 2019-11-11 DEVICE — DOME PAT 35MM 9MM STRL 3 PG: Type: IMPLANTABLE DEVICE | Site: KNEE | Status: FUNCTIONAL

## 2019-11-11 DEVICE — CEM BN NLTX STRL REUSE MED VSC: Type: IMPLANTABLE DEVICE | Site: KNEE | Status: FUNCTIONAL

## 2019-11-11 DEVICE — IMPLANTABLE DEVICE: Type: IMPLANTABLE DEVICE | Status: FUNCTIONAL

## 2019-11-11 DEVICE — COMP FEM 5 STRL KNEE R NPOR: Type: IMPLANTABLE DEVICE | Site: KNEE | Status: FUNCTIONAL

## 2019-11-11 RX ORDER — FAMOTIDINE 20 MG/1
20 TABLET ORAL ONCE
Status: DISCONTINUED | OUTPATIENT
Start: 2019-11-11 | End: 2019-11-11 | Stop reason: HOSPADM

## 2019-11-11 RX ORDER — GABAPENTIN 100 MG/1
100 CAPSULE ORAL 3 TIMES DAILY
Status: DISCONTINUED | OUTPATIENT
Start: 2019-11-11 | End: 2019-11-12

## 2019-11-11 RX ORDER — HYDROMORPHONE HYDROCHLORIDE 1 MG/ML
0.8 INJECTION, SOLUTION INTRAMUSCULAR; INTRAVENOUS; SUBCUTANEOUS EVERY 2 HOUR PRN
Status: DISCONTINUED | OUTPATIENT
Start: 2019-11-11 | End: 2019-11-12

## 2019-11-11 RX ORDER — SODIUM CHLORIDE, SODIUM LACTATE, POTASSIUM CHLORIDE, CALCIUM CHLORIDE 600; 310; 30; 20 MG/100ML; MG/100ML; MG/100ML; MG/100ML
INJECTION, SOLUTION INTRAVENOUS CONTINUOUS
Status: DISCONTINUED | OUTPATIENT
Start: 2019-11-11 | End: 2019-11-12

## 2019-11-11 RX ORDER — HYDROMORPHONE HYDROCHLORIDE 1 MG/ML
0.4 INJECTION, SOLUTION INTRAMUSCULAR; INTRAVENOUS; SUBCUTANEOUS EVERY 5 MIN PRN
Status: DISCONTINUED | OUTPATIENT
Start: 2019-11-11 | End: 2019-11-11 | Stop reason: HOSPADM

## 2019-11-11 RX ORDER — NALOXONE HYDROCHLORIDE 0.4 MG/ML
80 INJECTION, SOLUTION INTRAMUSCULAR; INTRAVENOUS; SUBCUTANEOUS AS NEEDED
Status: DISCONTINUED | OUTPATIENT
Start: 2019-11-11 | End: 2019-11-11 | Stop reason: HOSPADM

## 2019-11-11 RX ORDER — SODIUM CHLORIDE, SODIUM LACTATE, POTASSIUM CHLORIDE, CALCIUM CHLORIDE 600; 310; 30; 20 MG/100ML; MG/100ML; MG/100ML; MG/100ML
INJECTION, SOLUTION INTRAVENOUS CONTINUOUS
Status: DISCONTINUED | OUTPATIENT
Start: 2019-11-11 | End: 2019-11-11 | Stop reason: HOSPADM

## 2019-11-11 RX ORDER — PANTOPRAZOLE SODIUM 40 MG/1
40 TABLET, DELAYED RELEASE ORAL
Qty: 30 TABLET | Refills: 0 | Status: SHIPPED | OUTPATIENT
Start: 2019-11-11 | End: 2020-01-09

## 2019-11-11 RX ORDER — HYDROMORPHONE HYDROCHLORIDE 1 MG/ML
0.2 INJECTION, SOLUTION INTRAMUSCULAR; INTRAVENOUS; SUBCUTANEOUS EVERY 5 MIN PRN
Status: DISCONTINUED | OUTPATIENT
Start: 2019-11-11 | End: 2019-11-11 | Stop reason: HOSPADM

## 2019-11-11 RX ORDER — HYDROMORPHONE HYDROCHLORIDE 1 MG/ML
0.6 INJECTION, SOLUTION INTRAMUSCULAR; INTRAVENOUS; SUBCUTANEOUS EVERY 5 MIN PRN
Status: DISCONTINUED | OUTPATIENT
Start: 2019-11-11 | End: 2019-11-11 | Stop reason: HOSPADM

## 2019-11-11 RX ORDER — ACETAMINOPHEN 325 MG/1
650 TABLET ORAL EVERY 6 HOURS SCHEDULED
Status: DISCONTINUED | OUTPATIENT
Start: 2019-11-11 | End: 2019-11-12

## 2019-11-11 RX ORDER — DOCUSATE SODIUM 100 MG/1
100 CAPSULE, LIQUID FILLED ORAL 2 TIMES DAILY
Qty: 60 CAPSULE | Refills: 1 | Status: SHIPPED | OUTPATIENT
Start: 2019-11-11 | End: 2020-01-09 | Stop reason: ALTCHOICE

## 2019-11-11 RX ORDER — METOCLOPRAMIDE HYDROCHLORIDE 5 MG/ML
10 INJECTION INTRAMUSCULAR; INTRAVENOUS EVERY 6 HOURS PRN
Status: DISCONTINUED | OUTPATIENT
Start: 2019-11-11 | End: 2019-11-12

## 2019-11-11 RX ORDER — FAMOTIDINE 20 MG/1
20 TABLET ORAL 2 TIMES DAILY
Status: DISCONTINUED | OUTPATIENT
Start: 2019-11-11 | End: 2019-11-12

## 2019-11-11 RX ORDER — HYDROMORPHONE HYDROCHLORIDE 1 MG/ML
0.4 INJECTION, SOLUTION INTRAMUSCULAR; INTRAVENOUS; SUBCUTANEOUS EVERY 2 HOUR PRN
Status: DISCONTINUED | OUTPATIENT
Start: 2019-11-11 | End: 2019-11-12

## 2019-11-11 RX ORDER — METOCLOPRAMIDE 10 MG/1
10 TABLET ORAL ONCE
Status: DISCONTINUED | OUTPATIENT
Start: 2019-11-11 | End: 2019-11-11 | Stop reason: HOSPADM

## 2019-11-11 RX ORDER — SCOLOPAMINE TRANSDERMAL SYSTEM 1 MG/1
1 PATCH, EXTENDED RELEASE TRANSDERMAL ONCE
Status: DISCONTINUED | OUTPATIENT
Start: 2019-11-11 | End: 2019-11-12

## 2019-11-11 RX ORDER — DIPHENHYDRAMINE HCL 25 MG
25 CAPSULE ORAL EVERY 4 HOURS PRN
Status: DISCONTINUED | OUTPATIENT
Start: 2019-11-11 | End: 2019-11-12

## 2019-11-11 RX ORDER — HALOPERIDOL 5 MG/ML
0.25 INJECTION INTRAMUSCULAR ONCE AS NEEDED
Status: DISCONTINUED | OUTPATIENT
Start: 2019-11-11 | End: 2019-11-11 | Stop reason: HOSPADM

## 2019-11-11 RX ORDER — SODIUM CHLORIDE 0.9 % (FLUSH) 0.9 %
10 SYRINGE (ML) INJECTION AS NEEDED
Status: DISCONTINUED | OUTPATIENT
Start: 2019-11-11 | End: 2019-11-12

## 2019-11-11 RX ORDER — GABAPENTIN 100 MG/1
100 CAPSULE ORAL 3 TIMES DAILY
Qty: 90 CAPSULE | Refills: 0 | Status: SHIPPED | OUTPATIENT
Start: 2019-11-11 | End: 2020-01-09 | Stop reason: ALTCHOICE

## 2019-11-11 RX ORDER — BUPIVACAINE HYDROCHLORIDE 7.5 MG/ML
INJECTION, SOLUTION INTRASPINAL AS NEEDED
Status: DISCONTINUED | OUTPATIENT
Start: 2019-11-11 | End: 2019-11-11 | Stop reason: SURG

## 2019-11-11 RX ORDER — MAGNESIUM HYDROXIDE 1200 MG/15ML
LIQUID ORAL CONTINUOUS PRN
Status: COMPLETED | OUTPATIENT
Start: 2019-11-11 | End: 2019-11-11

## 2019-11-11 RX ORDER — HYDROCODONE BITARTRATE AND ACETAMINOPHEN 5; 325 MG/1; MG/1
2 TABLET ORAL AS NEEDED
Status: DISCONTINUED | OUTPATIENT
Start: 2019-11-11 | End: 2019-11-11 | Stop reason: HOSPADM

## 2019-11-11 RX ORDER — BISACODYL 10 MG
10 SUPPOSITORY, RECTAL RECTAL
Status: DISCONTINUED | OUTPATIENT
Start: 2019-11-11 | End: 2019-11-12

## 2019-11-11 RX ORDER — TRAMADOL HYDROCHLORIDE 50 MG/1
TABLET ORAL EVERY 6 HOURS PRN
Qty: 60 TABLET | Refills: 0 | Status: SHIPPED | OUTPATIENT
Start: 2019-11-11 | End: 2020-01-09 | Stop reason: ALTCHOICE

## 2019-11-11 RX ORDER — HYDROMORPHONE HYDROCHLORIDE 1 MG/ML
1.2 INJECTION, SOLUTION INTRAMUSCULAR; INTRAVENOUS; SUBCUTANEOUS EVERY 2 HOUR PRN
Status: DISCONTINUED | OUTPATIENT
Start: 2019-11-11 | End: 2019-11-12

## 2019-11-11 RX ORDER — DOCUSATE SODIUM 100 MG/1
100 CAPSULE, LIQUID FILLED ORAL 2 TIMES DAILY
Status: DISCONTINUED | OUTPATIENT
Start: 2019-11-11 | End: 2019-11-12

## 2019-11-11 RX ORDER — CYCLOBENZAPRINE HCL 5 MG
5 TABLET ORAL EVERY 8 HOURS PRN
Status: DISCONTINUED | OUTPATIENT
Start: 2019-11-11 | End: 2019-11-12

## 2019-11-11 RX ORDER — MIDAZOLAM HYDROCHLORIDE 1 MG/ML
INJECTION INTRAMUSCULAR; INTRAVENOUS AS NEEDED
Status: DISCONTINUED | OUTPATIENT
Start: 2019-11-11 | End: 2019-11-11 | Stop reason: SURG

## 2019-11-11 RX ORDER — ACETAMINOPHEN 500 MG
1000 TABLET ORAL ONCE
Status: COMPLETED | OUTPATIENT
Start: 2019-11-11 | End: 2019-11-11

## 2019-11-11 RX ORDER — HYDROCODONE BITARTRATE AND ACETAMINOPHEN 5; 325 MG/1; MG/1
1 TABLET ORAL AS NEEDED
Status: DISCONTINUED | OUTPATIENT
Start: 2019-11-11 | End: 2019-11-11 | Stop reason: HOSPADM

## 2019-11-11 RX ORDER — OXYCODONE HYDROCHLORIDE 5 MG/1
10 TABLET ORAL EVERY 4 HOURS PRN
Status: DISCONTINUED | OUTPATIENT
Start: 2019-11-11 | End: 2019-11-12

## 2019-11-11 RX ORDER — TRAMADOL HYDROCHLORIDE 50 MG/1
100 TABLET ORAL EVERY 6 HOURS PRN
Status: DISCONTINUED | OUTPATIENT
Start: 2019-11-11 | End: 2019-11-12

## 2019-11-11 RX ORDER — ONDANSETRON 2 MG/ML
4 INJECTION INTRAMUSCULAR; INTRAVENOUS EVERY 4 HOURS PRN
Status: DISCONTINUED | OUTPATIENT
Start: 2019-11-11 | End: 2019-11-12

## 2019-11-11 RX ORDER — PROCHLORPERAZINE EDISYLATE 5 MG/ML
5 INJECTION INTRAMUSCULAR; INTRAVENOUS ONCE AS NEEDED
Status: DISCONTINUED | OUTPATIENT
Start: 2019-11-11 | End: 2019-11-11 | Stop reason: HOSPADM

## 2019-11-11 RX ORDER — ONDANSETRON 2 MG/ML
4 INJECTION INTRAMUSCULAR; INTRAVENOUS ONCE AS NEEDED
Status: DISCONTINUED | OUTPATIENT
Start: 2019-11-11 | End: 2019-11-11 | Stop reason: HOSPADM

## 2019-11-11 RX ORDER — ASPIRIN 325 MG
325 TABLET ORAL 2 TIMES DAILY
Status: DISCONTINUED | OUTPATIENT
Start: 2019-11-11 | End: 2019-11-12

## 2019-11-11 RX ORDER — DEXAMETHASONE SODIUM PHOSPHATE 4 MG/ML
VIAL (ML) INJECTION AS NEEDED
Status: DISCONTINUED | OUTPATIENT
Start: 2019-11-11 | End: 2019-11-11 | Stop reason: SURG

## 2019-11-11 RX ORDER — ONDANSETRON 4 MG/1
4 TABLET, FILM COATED ORAL EVERY 6 HOURS PRN
Qty: 20 TABLET | Refills: 0 | Status: SHIPPED | OUTPATIENT
Start: 2019-11-11 | End: 2020-01-09 | Stop reason: ALTCHOICE

## 2019-11-11 RX ORDER — TRAMADOL HYDROCHLORIDE 50 MG/1
50 TABLET ORAL EVERY 6 HOURS PRN
Status: DISCONTINUED | OUTPATIENT
Start: 2019-11-11 | End: 2019-11-12

## 2019-11-11 RX ORDER — POLYETHYLENE GLYCOL 3350 17 G/17G
17 POWDER, FOR SOLUTION ORAL DAILY PRN
Status: DISCONTINUED | OUTPATIENT
Start: 2019-11-11 | End: 2019-11-12

## 2019-11-11 RX ORDER — ONDANSETRON 2 MG/ML
INJECTION INTRAMUSCULAR; INTRAVENOUS AS NEEDED
Status: DISCONTINUED | OUTPATIENT
Start: 2019-11-11 | End: 2019-11-11 | Stop reason: SURG

## 2019-11-11 RX ORDER — OXYCODONE HYDROCHLORIDE 5 MG/1
5 TABLET ORAL EVERY 4 HOURS PRN
Status: DISCONTINUED | OUTPATIENT
Start: 2019-11-11 | End: 2019-11-12

## 2019-11-11 RX ORDER — CELECOXIB 200 MG/1
200 CAPSULE ORAL ONCE
Status: COMPLETED | OUTPATIENT
Start: 2019-11-11 | End: 2019-11-11

## 2019-11-11 RX ORDER — FAMOTIDINE 10 MG/ML
20 INJECTION, SOLUTION INTRAVENOUS 2 TIMES DAILY
Status: DISCONTINUED | OUTPATIENT
Start: 2019-11-11 | End: 2019-11-12

## 2019-11-11 RX ORDER — PROCHLORPERAZINE EDISYLATE 5 MG/ML
10 INJECTION INTRAMUSCULAR; INTRAVENOUS EVERY 6 HOURS PRN
Status: DISCONTINUED | OUTPATIENT
Start: 2019-11-11 | End: 2019-11-12

## 2019-11-11 RX ORDER — DIPHENHYDRAMINE HYDROCHLORIDE 50 MG/ML
25 INJECTION INTRAMUSCULAR; INTRAVENOUS ONCE AS NEEDED
Status: ACTIVE | OUTPATIENT
Start: 2019-11-11 | End: 2019-11-11

## 2019-11-11 RX ORDER — LIDOCAINE HYDROCHLORIDE 10 MG/ML
INJECTION, SOLUTION EPIDURAL; INFILTRATION; INTRACAUDAL; PERINEURAL AS NEEDED
Status: DISCONTINUED | OUTPATIENT
Start: 2019-11-11 | End: 2019-11-11 | Stop reason: SURG

## 2019-11-11 RX ORDER — ASPIRIN 325 MG
325 TABLET, DELAYED RELEASE (ENTERIC COATED) ORAL 2 TIMES DAILY
Qty: 60 TABLET | Refills: 0 | Status: SHIPPED | OUTPATIENT
Start: 2019-11-11 | End: 2020-05-29 | Stop reason: DRUGHIGH

## 2019-11-11 RX ORDER — CEFAZOLIN SODIUM/WATER 2 G/20 ML
2 SYRINGE (ML) INTRAVENOUS EVERY 8 HOURS
Status: COMPLETED | OUTPATIENT
Start: 2019-11-11 | End: 2019-11-12

## 2019-11-11 RX ORDER — CEFAZOLIN SODIUM/WATER 2 G/20 ML
2 SYRINGE (ML) INTRAVENOUS ONCE
Status: COMPLETED | OUTPATIENT
Start: 2019-11-11 | End: 2019-11-11

## 2019-11-11 RX ORDER — MORPHINE SULFATE 4 MG/ML
4 INJECTION, SOLUTION INTRAMUSCULAR; INTRAVENOUS EVERY 10 MIN PRN
Status: DISCONTINUED | OUTPATIENT
Start: 2019-11-11 | End: 2019-11-11 | Stop reason: HOSPADM

## 2019-11-11 RX ORDER — MORPHINE SULFATE 10 MG/ML
6 INJECTION, SOLUTION INTRAMUSCULAR; INTRAVENOUS EVERY 10 MIN PRN
Status: DISCONTINUED | OUTPATIENT
Start: 2019-11-11 | End: 2019-11-11 | Stop reason: HOSPADM

## 2019-11-11 RX ORDER — DIPHENHYDRAMINE HYDROCHLORIDE 50 MG/ML
12.5 INJECTION INTRAMUSCULAR; INTRAVENOUS EVERY 4 HOURS PRN
Status: DISCONTINUED | OUTPATIENT
Start: 2019-11-11 | End: 2019-11-12

## 2019-11-11 RX ORDER — OXYCODONE HCL 10 MG/1
10 TABLET, FILM COATED, EXTENDED RELEASE ORAL ONCE
Status: COMPLETED | OUTPATIENT
Start: 2019-11-11 | End: 2019-11-11

## 2019-11-11 RX ORDER — MORPHINE SULFATE 2 MG/ML
2 INJECTION, SOLUTION INTRAMUSCULAR; INTRAVENOUS EVERY 10 MIN PRN
Status: DISCONTINUED | OUTPATIENT
Start: 2019-11-11 | End: 2019-11-11 | Stop reason: HOSPADM

## 2019-11-11 RX ORDER — CEFAZOLIN SODIUM/WATER 2 G/20 ML
2 SYRINGE (ML) INTRAVENOUS EVERY 8 HOURS
Status: DISCONTINUED | OUTPATIENT
Start: 2019-11-11 | End: 2019-11-11

## 2019-11-11 RX ORDER — SODIUM PHOSPHATE, DIBASIC AND SODIUM PHOSPHATE, MONOBASIC 7; 19 G/133ML; G/133ML
1 ENEMA RECTAL ONCE AS NEEDED
Status: DISCONTINUED | OUTPATIENT
Start: 2019-11-11 | End: 2019-11-12

## 2019-11-11 RX ORDER — OXYCODONE HYDROCHLORIDE 5 MG/1
TABLET ORAL EVERY 4 HOURS PRN
Qty: 60 TABLET | Refills: 0 | Status: SHIPPED | OUTPATIENT
Start: 2019-11-11 | End: 2020-01-09 | Stop reason: ALTCHOICE

## 2019-11-11 RX ADMIN — DEXAMETHASONE SODIUM PHOSPHATE 4 MG: 4 MG/ML VIAL (ML) INJECTION at 13:28:00

## 2019-11-11 RX ADMIN — ONDANSETRON 4 MG: 2 INJECTION INTRAMUSCULAR; INTRAVENOUS at 13:28:00

## 2019-11-11 RX ADMIN — LIDOCAINE HYDROCHLORIDE 50 MG: 10 INJECTION, SOLUTION EPIDURAL; INFILTRATION; INTRACAUDAL; PERINEURAL at 13:19:00

## 2019-11-11 RX ADMIN — LIDOCAINE HYDROCHLORIDE 10 MG: 10 INJECTION, SOLUTION EPIDURAL; INFILTRATION; INTRACAUDAL; PERINEURAL at 13:15:00

## 2019-11-11 RX ADMIN — SODIUM CHLORIDE, SODIUM LACTATE, POTASSIUM CHLORIDE, CALCIUM CHLORIDE: 600; 310; 30; 20 INJECTION, SOLUTION INTRAVENOUS at 14:47:00

## 2019-11-11 RX ADMIN — BUPIVACAINE HYDROCHLORIDE 1.5 ML: 7.5 INJECTION, SOLUTION INTRASPINAL at 13:18:00

## 2019-11-11 RX ADMIN — CEFAZOLIN SODIUM/WATER 2 G: 2 G/20 ML SYRINGE (ML) INTRAVENOUS at 13:28:00

## 2019-11-11 RX ADMIN — MIDAZOLAM HYDROCHLORIDE 2 MG: 1 INJECTION INTRAMUSCULAR; INTRAVENOUS at 13:13:00

## 2019-11-11 NOTE — ANESTHESIA POSTPROCEDURE EVALUATION
Patient: Henry Hill    Procedure Summary     Date:  11/11/19 Room / Location:  [facility] OR 05 / [facility] OR    Anesthesia Start:  7657 Anesthesia Stop:      Procedure:  KNEE TOTAL REPLACEMENT (Right Knee) Diagnosis:  (right knee degenerative joint dis

## 2019-11-11 NOTE — ANESTHESIA PREPROCEDURE EVALUATION
Anesthesia PreOp Note    HPI:     Felecia Araujo is a 75year old female who presents for preoperative consultation requested by: Janina Bradley MD    Date of Surgery: 11/11/2019    Procedure(s):  KNEE TOTAL REPLACEMENT  Indication: right knee degenerativ capsule, Rfl: 3, 11/10/2019 at 0900  Losartan Potassium 50 MG Oral Tab, Take 1 tablet (50 mg total) by mouth daily. , Disp: 90 tablet, Rfl: 3, 11/10/2019 at 0900  AMLODIPINE BESYLATE 5 MG Oral Tab, TAKE 1 TABLET (5 MG TOTAL) BY MOUTH DAILY. , Disp: 90 tablet 60 tablet, Rfl: 0  docusate sodium 100 MG Oral Cap, Take 1 capsule (100 mg total) by mouth 2 (two) times daily. , Disp: 60 capsule, Rfl: 1  gabapentin 100 MG Oral Cap, Take 1 capsule (100 mg total) by mouth 3 (three) times daily. , Disp: 90 capsule, Rfl: 0 Relationships      Social connections:        Talks on phone: Not on file        Gets together: Not on file        Attends Zoroastrianism service: Not on file        Active member of club or organization: Not on file        Attends meetings of clubs or Christine temperature is 98.4 °F (36.9 °C). Her blood pressure is 151/88 and her pulse is 99. Her respiration is 17 and oxygen saturation is 99%.     11/05/19  0916 11/11/19  1132 11/11/19  1144   BP:   151/88   Pulse:   99   Resp:   17   Temp:   98.4 °F (36.9 °C)

## 2019-11-11 NOTE — H&P
History & Physical Examination    Patient Name: Jonathon Vogel  MRN: [id number]  [facility]: [id number]  YOB: 1955    Diagnosis: right knee OA    Present Illness: see below    TRIAMTERENE-HCTZ 37.5-25 MG Oral Cap, TAKE 1 CAPSULE BY MOUTH EVERY DAY, hearing aids   • Lung nodule 2005   • Osteoarthrosis, unspecified whether generalized or localized, unspecified site 2004   • Osteopenia 2003   • Sleep apnea     oral device   • Unspecified essential hypertension    • Uses hearing aid    • Visual impairmen

## 2019-11-11 NOTE — ANESTHESIA PROCEDURE NOTES
Spinal Block  Performed by: Coty Addison CRNA  Authorized by: Coty Addison CRNA       General Information and Staff    Start Time:  11/11/2019 1:15 PM  End Time:  11/11/2019 1:18 PM  Anesthesiologist:  Ranjith Quinones MD  CRNA:  Tan Guerrero

## 2019-11-11 NOTE — BRIEF OP NOTE
Pre-Operative Diagnosis: right knee degenerative joint disease     Post-Operative Diagnosis: right knee degenerative joint disease      Procedure Performed:   Procedure(s):  right total knee arthroplasty    Surgeon(s) and Role:      Judy Lomax MD - Pr

## 2019-11-12 VITALS
BODY MASS INDEX: 23.37 KG/M2 | HEIGHT: 65.5 IN | DIASTOLIC BLOOD PRESSURE: 93 MMHG | WEIGHT: 142 LBS | SYSTOLIC BLOOD PRESSURE: 122 MMHG | RESPIRATION RATE: 18 BRPM | OXYGEN SATURATION: 98 % | TEMPERATURE: 98 F | HEART RATE: 68 BPM

## 2019-11-12 PROCEDURE — 99214 OFFICE O/P EST MOD 30 MIN: CPT | Performed by: HOSPITALIST

## 2019-11-12 NOTE — CM/SW NOTE
Pt has been screened per chart review and during discussions with RN. Pt has no identified needs at this time. Pt is planning on doing outpt PT. SW/CM will remain available for support and any discharge needs.      GUILHERME Petit, Nasima Wallace

## 2019-11-12 NOTE — PLAN OF CARE
Problem: Patient Centered Care  Goal: Patient preferences are identified and integrated in the patient's plan of care  Description  Interventions:  - What would you like us to know as we care for you?  - Provide timely, complete, and accurate information WBC  - Administer growth factors as ordered  - Implement neutropenic guidelines  Outcome: Progressing     Problem: SAFETY ADULT - FALL  Goal: Free from fall injury  Description  INTERVENTIONS:  - Assess pt frequently for physical needs  - Identify cognitiv

## 2019-11-12 NOTE — PHYSICAL THERAPY NOTE
PHYSICAL THERAPY EVALUATION - INPATIENT     Room Number: 415/415-A  Evaluation Date: 11/12/2019  Type of Evaluation: Initial   Physician Order: PT Eval and Treat    Presenting Problem: primary osteoarthritis of right knee  Reason for Therapy: Mobility Dys presents during this admission.     PHYSICAL THERAPY MEDICAL/SOCIAL HISTORY     Problem List  Principal Problem:    Primary osteoarthritis of right knee  Active Problems:    S/P total knee arthroplasty    Pre-op testing      Past Medical History  Past Medic 96    Lower extremity strength is within functional limits except for the following:    Right Knee extension  4-/5  Right Knee flexion  4-/5    BALANCE  Static Sitting: Good  Dynamic Sitting: Good  Static Standing: Fair +  Dynamic Standing: Fair +

## 2019-11-12 NOTE — PLAN OF CARE
Patient alert and oriented. Tele in place. Room air. Vitals stable. WBAT. Up with one assist and a walker. Pain controlled. Pt washed up by the sink this morning. Last dose of IV push Ancef this morning. Polar care. PT eval day.  Plan is for patient to do o unsuccessful or patient reports new pain  - Anticipate increased pain with activity and pre-medicate as appropriate  Outcome: Progressing     Problem: RISK FOR INFECTION - ADULT  Goal: Absence of fever/infection during anticipated neutropenic period  Descr

## 2019-11-12 NOTE — DISCHARGE SUMMARY
[facility] Discharge Summary   Patient ID:  Lillian Mane  [id number]  73 year old  1/10/1955    Admit date: 11/11/2019  Discharge date: 11/12/2019  Primary Care Physician: Pete Jimenez MD   Attending Physician: Светлана Cain MD   Cons needed for Nausea. oxyCODONE HCl 5 MG Tabs  Commonly known as:  ROXICODONE  Take 1-2 tablets (5-10 mg total) by mouth every 4 (four) hours as needed for Pain.      Pantoprazole Sodium 40 MG Tbec  Commonly known as:  PROTONIX  Take 1 tablet (40 mg total) Lelo Fajardo MD In 2 weeks.     Specialty:  SURGERY, ORTHOPEDIC  Contact information:  Geoffrey Jimenez [id number] [phone]                   -PCP in [] within 7 days [] within 14 days [] other     Disposition: home  Discharged Conditio

## 2019-11-12 NOTE — PLAN OF CARE
Problem: Patient Centered Care  Goal: Patient preferences are identified and integrated in the patient's plan of care  Description  Interventions:  - What would you like us to know as we care for you?   - Provide timely, complete, and accurate informatio period  Description  INTERVENTIONS  - Monitor WBC  - Administer growth factors as ordered  - Implement neutropenic guidelines  Outcome: Adequate for Discharge     Problem: SAFETY ADULT - FALL  Goal: Free from fall injury  Description  INTERVENTIONS:  - Ass with poler ice.  scds in use. Able to ambulate with assist with walker. worked with therapy. Safety measures applied and reviewed, Non skid socks in use. bed and chair exit alarm is in use,call light within reach. Bed is in lowest position.  Knee replacemen

## 2019-11-12 NOTE — PROGRESS NOTES
[facility]    Progress Note    Stormy Valdezapolonia Patient Status:  Outpatient in a Bed    1/10/1955 MRN [id number]   Location [facility] 4W/SW/SE Attending Marisela Desouza MD   Hosp Day # 0 PCP Mario Epstein MD       Subjective:

## 2019-11-18 NOTE — OPERATIVE REPORT
[address]  [address] [facility], [address] [place] [address] [address]  OPERATIVE REPORT  PATIENT NAME: Susan Feliciano  MR#: [id number]  PMD: Raji Aguayo  DATE OF PROCEDURE: 11/11/2019  PREOPERATIVE DIAGNOSIS: Degenerative Arthritis right knee  POSTOP the same day surgery program on Monday, November 11, 2019. Following proper identification in the preoperative holding area with confirmation of the above-stated procedure, the patient was brought to the main operating room suite. Procedure was confirmed. appropriate. The rotation was based between the medial and middle third of the tibial tubercle. This was provisionally pinned. A size 5 right EMPOWR 3D Non-Porous Femur femoral component was placed along with a 14 mm polyethylene spacer.  With this in place

## 2020-01-02 ENCOUNTER — TELEPHONE (OUTPATIENT)
Dept: INTERNAL MEDICINE CLINIC | Facility: CLINIC | Age: 65
End: 2020-01-02

## 2020-01-02 NOTE — TELEPHONE ENCOUNTER
Noted.  I have sent a prescription for typhoid vaccine that the patient to take every other day for a total of 8 days. She is to take 4 doses over the course of 8 days. She is to complete the course of typhoid vaccine at least 1 week prior to her trip.

## 2020-01-09 ENCOUNTER — OFFICE VISIT (OUTPATIENT)
Dept: INTERNAL MEDICINE CLINIC | Facility: CLINIC | Age: 65
End: 2020-01-09
Payer: MEDICARE

## 2020-01-09 VITALS
HEART RATE: 76 BPM | TEMPERATURE: 98 F | BODY MASS INDEX: 23.11 KG/M2 | DIASTOLIC BLOOD PRESSURE: 80 MMHG | HEIGHT: 65.5 IN | SYSTOLIC BLOOD PRESSURE: 120 MMHG | WEIGHT: 140.38 LBS | OXYGEN SATURATION: 98 %

## 2020-01-09 DIAGNOSIS — M15.9 PRIMARY OSTEOARTHRITIS INVOLVING MULTIPLE JOINTS: ICD-10-CM

## 2020-01-09 DIAGNOSIS — G47.30 SLEEP APNEA, UNSPECIFIED TYPE: ICD-10-CM

## 2020-01-09 DIAGNOSIS — E78.9 BORDERLINE HIGH CHOLESTEROL: ICD-10-CM

## 2020-01-09 DIAGNOSIS — I10 ESSENTIAL HYPERTENSION: Primary | ICD-10-CM

## 2020-01-09 DIAGNOSIS — Z96.652 HISTORY OF TOTAL LEFT KNEE REPLACEMENT: ICD-10-CM

## 2020-01-09 DIAGNOSIS — N18.30 STAGE 3 CHRONIC KIDNEY DISEASE (HCC): ICD-10-CM

## 2020-01-09 DIAGNOSIS — M85.88 OSTEOPENIA OF SPINE: ICD-10-CM

## 2020-01-09 DIAGNOSIS — E55.9 VITAMIN D DEFICIENCY: ICD-10-CM

## 2020-01-09 DIAGNOSIS — Z96.651 S/P REVISION OF TOTAL KNEE, RIGHT: ICD-10-CM

## 2020-01-09 DIAGNOSIS — R53.83 FATIGUE, UNSPECIFIED TYPE: ICD-10-CM

## 2020-01-09 DIAGNOSIS — Z00.00 ANNUAL PHYSICAL EXAM: ICD-10-CM

## 2020-01-09 PROCEDURE — G0463 HOSPITAL OUTPT CLINIC VISIT: HCPCS | Performed by: INTERNAL MEDICINE

## 2020-01-09 PROCEDURE — 99214 OFFICE O/P EST MOD 30 MIN: CPT | Performed by: INTERNAL MEDICINE

## 2020-01-09 NOTE — PROGRESS NOTES
Saralee Fothergill is a 75year old female. Patient presents with:  Checkup  Hypertension  Arthritis    HPI:   Patient presents with:  Checkup  Hypertension  Arthritis    Pt had a revision of her right total knee with Dr Fani Morse in November.   Surgery and rehab Unspecified essential hypertension    • Uses hearing aid    • Visual impairment     wears contact lens      Social History:  Social History    Tobacco Use      Smoking status: Never Smoker      Smokeless tobacco: Never Used    Alcohol use:  Yes      Alcohol joints  Stable. CPM.    3. S/P revision of total knee, right  Doing well.  CPM.  Patient is to continue her exercises to build up her strength in her right leg.     4. Osteopenia of spine  Patient's recent DEXA bone density showed that her bone scan was no

## 2020-01-09 NOTE — PATIENT INSTRUCTIONS
1.  Patient is to continue her current diet, medication and activity. 2.  Patient is to continue her current exercises to strengthen her right leg and right knee.   3.  I will plan to see the patient back in about 6 months with blood tests, urinalysis and

## 2020-03-19 ENCOUNTER — TELEPHONE (OUTPATIENT)
Dept: INTERNAL MEDICINE CLINIC | Facility: CLINIC | Age: 65
End: 2020-03-19

## 2020-03-19 DIAGNOSIS — N39.0 URINARY TRACT INFECTION WITHOUT HEMATURIA, SITE UNSPECIFIED: Primary | ICD-10-CM

## 2020-03-19 RX ORDER — CEPHALEXIN 250 MG/1
250 CAPSULE ORAL 4 TIMES DAILY
Qty: 40 CAPSULE | Refills: 0 | Status: SHIPPED | OUTPATIENT
Start: 2020-03-19 | End: 2020-03-29

## 2020-03-19 NOTE — TELEPHONE ENCOUNTER
Patient thinks she has a UTI & does not want to come to the office. Patient has had this going on for 10 days. Patient used AZO for 2 days last week. Patient has frequency, burning - no blood - slight backache.     Last night she had a temp of 100, b

## 2020-03-19 NOTE — TELEPHONE ENCOUNTER
To Dr. Helen Yu - see below, pt confirms sx. Pt would like to avoid lab if possible. Pharmacy confirmed.

## 2020-03-19 NOTE — TELEPHONE ENCOUNTER
Telephone call to patient and situation discussed. See nurse's note. Patient has symptoms of a urinary tract infection. She has had a low-grade fever on and off. Patient has no complaints of back pain.   She does complain of urinary frequency and dysuri

## 2020-03-20 NOTE — TELEPHONE ENCOUNTER
Patient calling, asking if she could be tested for CoVid 19. Has deep cough. Full deep breath causes her to cough. No other symptoms. Traveled to [facility]  2/7/2020-2/22/2020.   Someone in travel group, went to hospital in [facility] with respiratory infect

## 2020-03-20 NOTE — TELEPHONE ENCOUNTER
Telephone call to patient and situation discussed. Patient feels better today. Her fever has gone. Her urination issues are improving since she has been on the cephalexin. Patient does have some head congestion and drainage.   Patient has been back from

## 2020-05-01 RX ORDER — AMLODIPINE BESYLATE 5 MG/1
TABLET ORAL
Qty: 90 TABLET | Refills: 3 | Status: SHIPPED | OUTPATIENT
Start: 2020-05-01 | End: 2021-05-17

## 2020-05-29 ENCOUNTER — HOSPITAL ENCOUNTER (OUTPATIENT)
Dept: GENERAL RADIOLOGY | Age: 65
Discharge: HOME | End: 2020-05-29
Attending: INTERNAL MEDICINE | Admitting: INTERNAL MEDICINE
Payer: MEDICARE

## 2020-05-29 ENCOUNTER — OFFICE VISIT (OUTPATIENT)
Dept: INTERNAL MEDICINE CLINIC | Facility: CLINIC | Age: 65
End: 2020-05-29
Payer: MEDICARE

## 2020-05-29 ENCOUNTER — HOSPITAL ENCOUNTER (OUTPATIENT)
Dept: GENERAL RADIOLOGY | Age: 65
Discharge: HOME | End: 2020-05-29
Attending: INTERNAL MEDICINE
Payer: MEDICARE

## 2020-05-29 ENCOUNTER — TELEPHONE (OUTPATIENT)
Dept: INTERNAL MEDICINE CLINIC | Facility: CLINIC | Age: 65
End: 2020-05-29

## 2020-05-29 VITALS
WEIGHT: 143 LBS | BODY MASS INDEX: 23 KG/M2 | DIASTOLIC BLOOD PRESSURE: 84 MMHG | HEART RATE: 88 BPM | TEMPERATURE: 98 F | SYSTOLIC BLOOD PRESSURE: 134 MMHG | OXYGEN SATURATION: 100 %

## 2020-05-29 DIAGNOSIS — M25.472 LEFT ANKLE SWELLING: ICD-10-CM

## 2020-05-29 DIAGNOSIS — M79.662 PAIN IN LEFT LOWER LEG: Primary | ICD-10-CM

## 2020-05-29 DIAGNOSIS — M79.662 PAIN IN LEFT LOWER LEG: ICD-10-CM

## 2020-05-29 DIAGNOSIS — I10 ESSENTIAL HYPERTENSION: ICD-10-CM

## 2020-05-29 DIAGNOSIS — M65.9 TENOSYNOVITIS OF LEFT FOOT: ICD-10-CM

## 2020-05-29 DIAGNOSIS — M15.9 PRIMARY OSTEOARTHRITIS INVOLVING MULTIPLE JOINTS: ICD-10-CM

## 2020-05-29 PROCEDURE — 99214 OFFICE O/P EST MOD 30 MIN: CPT | Performed by: INTERNAL MEDICINE

## 2020-05-29 PROCEDURE — 73610 X-RAY EXAM OF ANKLE: CPT | Performed by: INTERNAL MEDICINE

## 2020-05-29 PROCEDURE — G0463 HOSPITAL OUTPT CLINIC VISIT: HCPCS | Performed by: INTERNAL MEDICINE

## 2020-05-29 PROCEDURE — 73590 X-RAY EXAM OF LOWER LEG: CPT | Performed by: INTERNAL MEDICINE

## 2020-05-29 RX ORDER — ASPIRIN 81 MG/1
81 TABLET ORAL DAILY
COMMUNITY

## 2020-05-29 RX ORDER — NAPROXEN 375 MG/1
375 TABLET ORAL 2 TIMES DAILY WITH MEALS
Qty: 30 TABLET | Refills: 2 | Status: SHIPPED | OUTPATIENT
Start: 2020-05-29 | End: 2020-06-13

## 2020-05-29 NOTE — TELEPHONE ENCOUNTER
I spoke with patient and she is agreeable to see Dr. Jessie Eden at 3:30 p.m. [address] [facility], please add her.

## 2020-05-29 NOTE — TELEPHONE ENCOUNTER
Noted. Please call patient and asked patient to come to see me today at 3:30 so I can evaluate her leg. I will route this to nursing.   Thank you!!

## 2020-05-29 NOTE — PROGRESS NOTES
Diya Torres is a 77year old female. Patient presents with: Ankle Pain: left    HPI:   Patient presents with:   Ankle Pain: left    Patient was out for a walk with a friend about 8 days ago when the patient developed severe pain in the left side of her generalized or localized, unspecified site 2004   • Osteopenia 2003   • Sleep apnea     oral device   • Unspecified essential hypertension    • Uses hearing aid    • Visual impairment     wears contact lens      Social History:  Social History    Tobacco U hypertension  Primary osteoarthritis involving multiple joints    Patient is to continue her current diet, medication and activity. I will obtain x-rays of patient's left lower leg and left ankle.   Patient's main complaint of pain with mild erythema and w

## 2020-05-29 NOTE — PATIENT INSTRUCTIONS
1.  Patient is to continue her current diet, medications and activity. 2.  I will obtain x-rays of patient's left lower leg and left ankle. 3.  I will place the patient on Naprosyn 375 mg orally twice daily that she should take twice a day with meals.   4

## 2020-05-29 NOTE — TELEPHONE ENCOUNTER
Dr. Edmund Payton can see the patient at 2:00 p.m. Patient was agreeable but then says that she prefers to not come in. She is very concerned she will be exposed to the virus. She asks if it is necessary or if a tele visit can be done. To Dr. Edmund Payton.

## 2020-05-29 NOTE — TELEPHONE ENCOUNTER
Please call pt, she injured ankle it is swollen and painful, tibia bone is most painful after a walk with a friend  Should pt go for xray?   Please call to discuss/advise  Tasked to nursing

## 2020-05-29 NOTE — TELEPHONE ENCOUNTER
Her left ankle and shin are bothering her. The ankle and foot are swollen. The pain is slightly above the ankle. This started a week ago after walking outside with her friend. She is icing but it has not improved. She would like an x-ray done.  Dr. Pita Howard

## 2020-06-02 ENCOUNTER — TELEPHONE (OUTPATIENT)
Dept: INTERNAL MEDICINE CLINIC | Facility: CLINIC | Age: 65
End: 2020-06-02

## 2020-06-03 NOTE — TELEPHONE ENCOUNTER
Telephone call to pt and Xray results discussed,  Pt's Xrays have shown pt to have a nondisplaced stress fracture of her left fibula. I have recommended that pt see an Orthopedic MD for evaluation.   Pt has seen Dr Americo Vicente in the past.  Pt will let me know

## 2020-06-08 RX ORDER — LOSARTAN POTASSIUM 50 MG/1
TABLET ORAL
Qty: 90 TABLET | Refills: 3 | Status: SHIPPED | OUTPATIENT
Start: 2020-06-08 | End: 2020-07-15 | Stop reason: ALTCHOICE

## 2020-06-08 NOTE — TELEPHONE ENCOUNTER
[facility] [place] requesting alternative for:  Losartan 50MG  \"alternative requested:change to 100MG tab 50 is on backorder\"  Form placed in purple folder  Tasked to [employer]

## 2020-06-08 NOTE — TELEPHONE ENCOUNTER
High Drug-Drug: Triamterene-HCTZ and losartan Potassium   The risk of hyperkalemia may be increased when potassium-sparing diuretics are co-administered with angiotensin II receptor antagonists.

## 2020-06-09 RX ORDER — LOSARTAN POTASSIUM 100 MG/1
50 TABLET ORAL DAILY
Qty: 45 TABLET | Refills: 0 | Status: SHIPPED | OUTPATIENT
Start: 2020-06-09 | End: 2020-09-03

## 2020-07-10 ENCOUNTER — LAB ENCOUNTER (OUTPATIENT)
Dept: LAB | Age: 65
End: 2020-07-10
Attending: INTERNAL MEDICINE
Payer: MEDICARE

## 2020-07-10 DIAGNOSIS — E78.9 BORDERLINE HIGH CHOLESTEROL: ICD-10-CM

## 2020-07-10 DIAGNOSIS — E55.9 VITAMIN D DEFICIENCY: ICD-10-CM

## 2020-07-10 DIAGNOSIS — Z00.00 ANNUAL PHYSICAL EXAM: ICD-10-CM

## 2020-07-10 DIAGNOSIS — R53.83 FATIGUE, UNSPECIFIED TYPE: ICD-10-CM

## 2020-07-10 LAB
25(OH)D3 SERPL-MCNC: 41.9 NG/ML (ref 30–100)
ALBUMIN SERPL-MCNC: 3.7 G/DL (ref 3.4–5)
ALBUMIN/GLOB SERPL: 0.9 {RATIO} (ref 1–2)
ALP LIVER SERPL-CCNC: 91 U/L (ref 55–142)
ALT SERPL-CCNC: 21 U/L (ref 13–56)
ANION GAP SERPL CALC-SCNC: 6 MMOL/L (ref 0–18)
AST SERPL-CCNC: 18 U/L (ref 15–37)
BASOPHILS # BLD AUTO: 0.05 X10(3) UL (ref 0–0.2)
BASOPHILS NFR BLD AUTO: 0.7 %
BILIRUB SERPL-MCNC: 0.6 MG/DL (ref 0.1–2)
BILIRUB UR QL: NEGATIVE
BUN BLD-MCNC: 22 MG/DL (ref 7–18)
BUN/CREAT SERPL: 20.2 (ref 10–20)
CALCIUM BLD-MCNC: 10.2 MG/DL (ref 8.5–10.1)
CHLORIDE SERPL-SCNC: 104 MMOL/L (ref 98–112)
CHOLEST SMN-MCNC: 181 MG/DL (ref ?–200)
CO2 SERPL-SCNC: 28 MMOL/L (ref 21–32)
COLOR UR: YELLOW
CREAT BLD-MCNC: 1.09 MG/DL (ref 0.55–1.02)
DEPRECATED RDW RBC AUTO: 43.8 FL (ref 35.1–46.3)
EOSINOPHIL # BLD AUTO: 0.19 X10(3) UL (ref 0–0.7)
EOSINOPHIL NFR BLD AUTO: 2.6 %
ERYTHROCYTE [DISTWIDTH] IN BLOOD BY AUTOMATED COUNT: 12.8 % (ref 11–15)
GLOBULIN PLAS-MCNC: 4 G/DL (ref 2.8–4.4)
GLUCOSE BLD-MCNC: 91 MG/DL (ref 70–99)
GLUCOSE UR-MCNC: NEGATIVE MG/DL
HCT VFR BLD AUTO: 36.8 % (ref 35–48)
HDLC SERPL-MCNC: 47 MG/DL (ref 40–59)
HGB BLD-MCNC: 12.2 G/DL (ref 12–16)
IMM GRANULOCYTES # BLD AUTO: 0.03 X10(3) UL (ref 0–1)
IMM GRANULOCYTES NFR BLD: 0.4 %
KETONES UR-MCNC: NEGATIVE MG/DL
LDLC SERPL CALC-MCNC: 107 MG/DL (ref ?–100)
LYMPHOCYTES # BLD AUTO: 2.09 X10(3) UL (ref 1–4)
LYMPHOCYTES NFR BLD AUTO: 29.1 %
M PROTEIN MFR SERPL ELPH: 7.7 G/DL (ref 6.4–8.2)
MCH RBC QN AUTO: 31 PG (ref 26–34)
MCHC RBC AUTO-ENTMCNC: 33.2 G/DL (ref 31–37)
MCV RBC AUTO: 93.4 FL (ref 80–100)
MONOCYTES # BLD AUTO: 0.7 X10(3) UL (ref 0.1–1)
MONOCYTES NFR BLD AUTO: 9.7 %
NEUTROPHILS # BLD AUTO: 4.12 X10 (3) UL (ref 1.5–7.7)
NEUTROPHILS # BLD AUTO: 4.12 X10(3) UL (ref 1.5–7.7)
NEUTROPHILS NFR BLD AUTO: 57.5 %
NITRITE UR QL STRIP.AUTO: POSITIVE
NONHDLC SERPL-MCNC: 134 MG/DL (ref ?–130)
OSMOLALITY SERPL CALC.SUM OF ELEC: 289 MOSM/KG (ref 275–295)
PATIENT FASTING Y/N/NP: YES
PATIENT FASTING Y/N/NP: YES
PH UR: 7 [PH] (ref 5–8)
PLATELET # BLD AUTO: 316 10(3)UL (ref 150–450)
POTASSIUM SERPL-SCNC: 4.2 MMOL/L (ref 3.5–5.1)
PROT UR-MCNC: NEGATIVE MG/DL
RBC # BLD AUTO: 3.94 X10(6)UL (ref 3.8–5.3)
RBC #/AREA URNS AUTO: 23 /HPF
SODIUM SERPL-SCNC: 138 MMOL/L (ref 136–145)
SP GR UR STRIP: 1.01 (ref 1–1.03)
TRIGL SERPL-MCNC: 136 MG/DL (ref 30–149)
TSI SER-ACNC: 1.95 MIU/ML (ref 0.36–3.74)
UROBILINOGEN UR STRIP-ACNC: <2
VLDLC SERPL CALC-MCNC: 27 MG/DL (ref 0–30)
WBC # BLD AUTO: 7.2 X10(3) UL (ref 4–11)
WBC #/AREA URNS AUTO: 151 /HPF

## 2020-07-10 PROCEDURE — 36415 COLL VENOUS BLD VENIPUNCTURE: CPT

## 2020-07-10 PROCEDURE — 85025 COMPLETE CBC W/AUTO DIFF WBC: CPT

## 2020-07-10 PROCEDURE — 87086 URINE CULTURE/COLONY COUNT: CPT | Performed by: INTERNAL MEDICINE

## 2020-07-10 PROCEDURE — 84443 ASSAY THYROID STIM HORMONE: CPT

## 2020-07-10 PROCEDURE — 80061 LIPID PANEL: CPT

## 2020-07-10 PROCEDURE — 80053 COMPREHEN METABOLIC PANEL: CPT

## 2020-07-10 PROCEDURE — 82306 VITAMIN D 25 HYDROXY: CPT

## 2020-07-10 PROCEDURE — 81001 URINALYSIS AUTO W/SCOPE: CPT | Performed by: INTERNAL MEDICINE

## 2020-07-10 PROCEDURE — 87077 CULTURE AEROBIC IDENTIFY: CPT | Performed by: INTERNAL MEDICINE

## 2020-07-10 PROCEDURE — 87186 SC STD MICRODIL/AGAR DIL: CPT | Performed by: INTERNAL MEDICINE

## 2020-07-14 ENCOUNTER — TELEPHONE (OUTPATIENT)
Dept: INTERNAL MEDICINE CLINIC | Facility: CLINIC | Age: 65
End: 2020-07-14

## 2020-07-14 RX ORDER — NITROFURANTOIN 25; 75 MG/1; MG/1
100 CAPSULE ORAL 2 TIMES DAILY
Qty: 14 CAPSULE | Refills: 0 | Status: SHIPPED | OUTPATIENT
Start: 2020-07-14 | End: 2020-07-21

## 2020-07-14 NOTE — TELEPHONE ENCOUNTER
(on call) please review patients 7/10/2020 urine culture results and advise. Patient is scheduled to see  tomorrow morning.

## 2020-07-14 NOTE — TELEPHONE ENCOUNTER
She has bladder infection.   Start Macrobid 100 mg twice daily for 1 week and see Dr. Ramirez Files tomorrow

## 2020-07-14 NOTE — TELEPHONE ENCOUNTER
RX sent per MD written order. Spoke to pt and advised on MD message below; pt verbalized understanding.

## 2020-07-15 ENCOUNTER — OFFICE VISIT (OUTPATIENT)
Dept: INTERNAL MEDICINE CLINIC | Facility: CLINIC | Age: 65
End: 2020-07-15
Payer: MEDICARE

## 2020-07-15 VITALS
DIASTOLIC BLOOD PRESSURE: 80 MMHG | SYSTOLIC BLOOD PRESSURE: 120 MMHG | OXYGEN SATURATION: 98 % | BODY MASS INDEX: 23.37 KG/M2 | WEIGHT: 142 LBS | HEART RATE: 76 BPM | TEMPERATURE: 98 F | RESPIRATION RATE: 16 BRPM | HEIGHT: 65.5 IN

## 2020-07-15 DIAGNOSIS — M81.0 AGE-RELATED OSTEOPOROSIS WITHOUT CURRENT PATHOLOGICAL FRACTURE: ICD-10-CM

## 2020-07-15 DIAGNOSIS — R53.83 FATIGUE, UNSPECIFIED TYPE: ICD-10-CM

## 2020-07-15 DIAGNOSIS — M85.88 OSTEOPENIA OF SPINE: ICD-10-CM

## 2020-07-15 DIAGNOSIS — Z00.00 ANNUAL PHYSICAL EXAM: Primary | ICD-10-CM

## 2020-07-15 DIAGNOSIS — E78.9 BORDERLINE HIGH CHOLESTEROL: ICD-10-CM

## 2020-07-15 DIAGNOSIS — N39.0 URINARY TRACT INFECTION WITHOUT HEMATURIA, SITE UNSPECIFIED: ICD-10-CM

## 2020-07-15 DIAGNOSIS — Z96.651 S/P REVISION OF TOTAL KNEE, RIGHT: ICD-10-CM

## 2020-07-15 DIAGNOSIS — S82.892S CLOSED FRACTURE OF LEFT ANKLE, SEQUELA: ICD-10-CM

## 2020-07-15 DIAGNOSIS — I10 ESSENTIAL HYPERTENSION: ICD-10-CM

## 2020-07-15 DIAGNOSIS — N18.30 STAGE 3 CHRONIC KIDNEY DISEASE (HCC): ICD-10-CM

## 2020-07-15 DIAGNOSIS — M15.9 PRIMARY OSTEOARTHRITIS INVOLVING MULTIPLE JOINTS: ICD-10-CM

## 2020-07-15 DIAGNOSIS — Z96.652 HISTORY OF TOTAL LEFT KNEE REPLACEMENT: ICD-10-CM

## 2020-07-15 DIAGNOSIS — E55.9 VITAMIN D DEFICIENCY: ICD-10-CM

## 2020-07-15 PROBLEM — S82.892A CLOSED FRACTURE OF LEFT ANKLE: Status: ACTIVE | Noted: 2020-07-15

## 2020-07-15 PROCEDURE — G0463 HOSPITAL OUTPT CLINIC VISIT: HCPCS | Performed by: INTERNAL MEDICINE

## 2020-07-15 PROCEDURE — 93010 ELECTROCARDIOGRAM REPORT: CPT | Performed by: INTERNAL MEDICINE

## 2020-07-15 PROCEDURE — G0439 PPPS, SUBSEQ VISIT: HCPCS | Performed by: INTERNAL MEDICINE

## 2020-07-15 PROCEDURE — 90732 PPSV23 VACC 2 YRS+ SUBQ/IM: CPT | Performed by: INTERNAL MEDICINE

## 2020-07-15 PROCEDURE — 99214 OFFICE O/P EST MOD 30 MIN: CPT | Performed by: INTERNAL MEDICINE

## 2020-07-15 PROCEDURE — G0009 ADMIN PNEUMOCOCCAL VACCINE: HCPCS | Performed by: INTERNAL MEDICINE

## 2020-07-15 PROCEDURE — 93005 ELECTROCARDIOGRAM TRACING: CPT | Performed by: INTERNAL MEDICINE

## 2020-07-15 NOTE — TELEPHONE ENCOUNTER
Patient was seen in the office today. Patient is currently taking Macrobid 1 tablet orally twice a day for 7 days. I have ordered a repeat urinalysis with urine culture reflex to be done after she completes her course of antibiotics.

## 2020-07-15 NOTE — PATIENT INSTRUCTIONS
1.  Patient is to continue her current diet, medication and activity. 2.  Patient is to complete her course of Macrobid for her current urinary tract infection.   3.  I will obtain a repeat urinalysis with urine culture reflex after she completes her cours

## 2020-07-15 NOTE — PROGRESS NOTES
HPI:   Teena Marin is a 77year old female who was seen by me on July 15, 2020 for her Medicare annual physical examination. At this time . Citlalli Alva indicated that she was doing well. She notes that her left ankle fracture is doing well.   Patient saw 0.5 % External Cream Use bid as directed 60 g 1   • Glucosamine-Chondroitin 500-400 MG Oral Cap Take 1 tablet by mouth 2 (two) times daily. • Coenzyme Q10 (COQ10 OR) Take 1 tablet by mouth daily.        • Calcium Carb-Cholecalciferol ([facility] congestion, sinus pain or ST  LUNGS: denies shortness of breath or cough  CARDIOVASCULAR: denies chest pain, pressure, or palpitations  GI: denies abdominal pain, nausea, vomiting, diarrhea, constipation, hematochezia, or melena  :No urinary complaints physical exam  Patient appears to be doing well at this time. Patient is to continue her current diet, medication and activity. Patient is to take her Macrobid as prescribed 1 tablet twice a day for 7 days.   Patient was given an order to have a repeat ur patient's renal function in the future. I will see the patient back in 6 months with blood tests as noted above. - BASIC METABOLIC PANEL (8); Future    8.  History of total left knee replacement  Patient had a recent left total knee replacement performe Moderate    How would you describe your current health state?: Good    How do you maintain positive mental well-being?: Visiting Family    If you are a male age 38-65 or a female age 47-67, do you take aspirin?: Yes    Have you had any immunizations at ano Correct    Recall \"Flag\": Correct    Recall \"Tree\": Correct        Shweta De Santiago's SCREENING SCHEDULE   Tests on this list are recommended by your physician but may not be covered, or covered at this frequency, by your insurer.  Please check with your for this or any previous visit.  Update Immunization Activity if applicable    Pneumococcal Orders placed or performed in visit on 07/15/20   • PNEUMOCOCCAL IMM (PNEUMOVAX)   Orders placed or performed in visit on 12/21/18   • PNEUMOCOCCAL VACC, 13 JOHANN IM

## 2020-07-27 ENCOUNTER — APPOINTMENT (OUTPATIENT)
Dept: LAB | Age: 65
End: 2020-07-27
Attending: INTERNAL MEDICINE
Payer: MEDICARE

## 2020-07-27 ENCOUNTER — TELEPHONE (OUTPATIENT)
Dept: INTERNAL MEDICINE CLINIC | Facility: CLINIC | Age: 65
End: 2020-07-27

## 2020-07-27 LAB
BILIRUB UR QL: NEGATIVE
CLARITY UR: CLEAR
COLOR UR: YELLOW
GLUCOSE UR-MCNC: NEGATIVE MG/DL
HGB UR QL STRIP.AUTO: NEGATIVE
KETONES UR-MCNC: NEGATIVE MG/DL
NITRITE UR QL STRIP.AUTO: NEGATIVE
PH UR: 7 [PH] (ref 5–8)
PROT UR-MCNC: NEGATIVE MG/DL
RBC #/AREA URNS AUTO: 1 /HPF
SP GR UR STRIP: 1.01 (ref 1–1.03)
UROBILINOGEN UR STRIP-ACNC: <2
WBC #/AREA URNS AUTO: 1 /HPF

## 2020-07-27 PROCEDURE — 81001 URINALYSIS AUTO W/SCOPE: CPT | Performed by: INTERNAL MEDICINE

## 2020-07-27 NOTE — TELEPHONE ENCOUNTER
Dorothea Matos phone call to patient to inform her that her repeat urinalysis turned out fine. Patient's urinalysis is normal.  There is no sign of a urinary tract infection at this time.

## 2020-07-29 ENCOUNTER — HOSPITAL ENCOUNTER (OUTPATIENT)
Dept: BONE DENSITY | Age: 65
Discharge: HOME | End: 2020-07-29
Attending: OBSTETRICS & GYNECOLOGY
Payer: MEDICARE

## 2020-07-29 ENCOUNTER — HOSPITAL ENCOUNTER (OUTPATIENT)
Dept: MAMMOGRAPHY | Age: 65
Discharge: HOME | End: 2020-07-29
Attending: OBSTETRICS & GYNECOLOGY
Payer: MEDICARE

## 2020-07-29 DIAGNOSIS — M81.0 AGE-RELATED OSTEOPOROSIS WITHOUT CURRENT PATHOLOGICAL FRACTURE: ICD-10-CM

## 2020-07-29 DIAGNOSIS — Z12.31 ENCOUNTER FOR SCREENING MAMMOGRAM FOR MALIGNANT NEOPLASM OF BREAST: ICD-10-CM

## 2020-07-29 PROCEDURE — 77080 DXA BONE DENSITY AXIAL: CPT | Performed by: OBSTETRICS & GYNECOLOGY

## 2020-07-29 PROCEDURE — 77063 BREAST TOMOSYNTHESIS BI: CPT | Performed by: OBSTETRICS & GYNECOLOGY

## 2020-07-29 PROCEDURE — 77067 SCR MAMMO BI INCL CAD: CPT | Performed by: OBSTETRICS & GYNECOLOGY

## 2020-08-04 DIAGNOSIS — M25.561 ACUTE PAIN OF RIGHT KNEE: Primary | ICD-10-CM

## 2020-08-11 ENCOUNTER — HOSPITAL ENCOUNTER (OUTPATIENT)
Dept: NUCLEAR MEDICINE | Facility: HOSPITAL | Age: 65
Discharge: HOME | End: 2020-08-11
Attending: PHYSICIAN ASSISTANT
Payer: MEDICARE

## 2020-08-11 DIAGNOSIS — M25.561 ACUTE PAIN OF RIGHT KNEE: ICD-10-CM

## 2020-08-11 PROCEDURE — 78315 BONE IMAGING 3 PHASE: CPT | Performed by: PHYSICIAN ASSISTANT

## 2020-08-23 RX ORDER — TRIAMTERENE AND HYDROCHLOROTHIAZIDE 37.5; 25 MG/1; MG/1
CAPSULE ORAL
Qty: 90 CAPSULE | Refills: 3 | Status: SHIPPED | OUTPATIENT
Start: 2020-08-23 | End: 2021-09-02

## 2020-08-23 NOTE — TELEPHONE ENCOUNTER
High medication interaction alert:  Drug-Drug: Triamterene-HCTZ and losartan   The risk of hyperkalemia may be increased when potassium-sparing diuretics are co-administered with angiotensin II receptor antagonists    To Kalee per refill protocol.

## 2020-09-02 NOTE — TELEPHONE ENCOUNTER
To Carola Hawkins-- OK to refill with recent elevated kidney function?  1/2 tab was prescribed due to national back order of 50mg tabs

## 2020-09-03 ENCOUNTER — APPOINTMENT (OUTPATIENT)
Dept: CT IMAGING | Facility: HOSPITAL | Age: 65
End: 2020-09-03
Attending: EMERGENCY MEDICINE
Payer: MEDICARE

## 2020-09-03 ENCOUNTER — TELEPHONE (OUTPATIENT)
Dept: INTERNAL MEDICINE CLINIC | Facility: CLINIC | Age: 65
End: 2020-09-03

## 2020-09-03 ENCOUNTER — HOSPITAL ENCOUNTER (EMERGENCY)
Facility: HOSPITAL | Age: 65
Discharge: HOME | End: 2020-09-03
Attending: EMERGENCY MEDICINE
Payer: MEDICARE

## 2020-09-03 VITALS
SYSTOLIC BLOOD PRESSURE: 113 MMHG | BODY MASS INDEX: 23.32 KG/M2 | DIASTOLIC BLOOD PRESSURE: 74 MMHG | RESPIRATION RATE: 20 BRPM | TEMPERATURE: 98 F | WEIGHT: 140 LBS | OXYGEN SATURATION: 99 % | HEIGHT: 65 IN | HEART RATE: 84 BPM

## 2020-09-03 DIAGNOSIS — R10.32 ABDOMINAL PAIN, LEFT LOWER QUADRANT: Primary | ICD-10-CM

## 2020-09-03 DIAGNOSIS — Z86.19 HISTORY OF ESBL E. COLI INFECTION: ICD-10-CM

## 2020-09-03 LAB
ALBUMIN SERPL-MCNC: 3.4 G/DL (ref 3.4–5)
ALP LIVER SERPL-CCNC: 80 U/L (ref 55–142)
ALT SERPL-CCNC: 17 U/L (ref 13–56)
ANION GAP SERPL CALC-SCNC: 8 MMOL/L (ref 0–18)
AST SERPL-CCNC: 17 U/L (ref 15–37)
BASOPHILS # BLD AUTO: 0.05 X10(3) UL (ref 0–0.2)
BASOPHILS NFR BLD AUTO: 0.4 %
BILIRUB DIRECT SERPL-MCNC: 0.1 MG/DL (ref 0–0.2)
BILIRUB SERPL-MCNC: 0.7 MG/DL (ref 0.1–2)
BILIRUB UR QL: NEGATIVE
BUN BLD-MCNC: 22 MG/DL (ref 7–18)
BUN/CREAT SERPL: 18.6 (ref 10–20)
CALCIUM BLD-MCNC: 9.6 MG/DL (ref 8.5–10.1)
CHLORIDE SERPL-SCNC: 103 MMOL/L (ref 98–112)
CLARITY UR: CLEAR
CO2 SERPL-SCNC: 25 MMOL/L (ref 21–32)
COLOR UR: YELLOW
CREAT BLD-MCNC: 1.18 MG/DL (ref 0.55–1.02)
DEPRECATED RDW RBC AUTO: 44.6 FL (ref 35.1–46.3)
EOSINOPHIL # BLD AUTO: 0.07 X10(3) UL (ref 0–0.7)
EOSINOPHIL NFR BLD AUTO: 0.6 %
ERYTHROCYTE [DISTWIDTH] IN BLOOD BY AUTOMATED COUNT: 13.2 % (ref 11–15)
GLUCOSE BLD-MCNC: 91 MG/DL (ref 70–99)
GLUCOSE UR-MCNC: NEGATIVE MG/DL
HCT VFR BLD AUTO: 36 % (ref 35–48)
HGB BLD-MCNC: 12.2 G/DL (ref 12–16)
HGB UR QL STRIP.AUTO: NEGATIVE
IMM GRANULOCYTES # BLD AUTO: 0.06 X10(3) UL (ref 0–1)
IMM GRANULOCYTES NFR BLD: 0.5 %
KETONES UR-MCNC: NEGATIVE MG/DL
LEUKOCYTE ESTERASE UR QL STRIP.AUTO: NEGATIVE
LIPASE SERPL-CCNC: 188 U/L (ref 73–393)
LYMPHOCYTES # BLD AUTO: 2.37 X10(3) UL (ref 1–4)
LYMPHOCYTES NFR BLD AUTO: 19.5 %
M PROTEIN MFR SERPL ELPH: 8 G/DL (ref 6.4–8.2)
MCH RBC QN AUTO: 31.1 PG (ref 26–34)
MCHC RBC AUTO-ENTMCNC: 33.9 G/DL (ref 31–37)
MCV RBC AUTO: 91.8 FL (ref 80–100)
MONOCYTES # BLD AUTO: 1.27 X10(3) UL (ref 0.1–1)
MONOCYTES NFR BLD AUTO: 10.5 %
NEUTROPHILS # BLD AUTO: 8.33 X10 (3) UL (ref 1.5–7.7)
NEUTROPHILS # BLD AUTO: 8.33 X10(3) UL (ref 1.5–7.7)
NEUTROPHILS NFR BLD AUTO: 68.5 %
NITRITE UR QL STRIP.AUTO: NEGATIVE
OSMOLALITY SERPL CALC.SUM OF ELEC: 285 MOSM/KG (ref 275–295)
PH UR: 7 [PH] (ref 5–8)
PLATELET # BLD AUTO: 296 10(3)UL (ref 150–450)
POTASSIUM SERPL-SCNC: 3.5 MMOL/L (ref 3.5–5.1)
PROT UR-MCNC: NEGATIVE MG/DL
RBC # BLD AUTO: 3.92 X10(6)UL (ref 3.8–5.3)
RBC #/AREA URNS AUTO: 2 /HPF
SODIUM SERPL-SCNC: 136 MMOL/L (ref 136–145)
SP GR UR STRIP: 1.01 (ref 1–1.03)
UROBILINOGEN UR STRIP-ACNC: <2
WBC # BLD AUTO: 12.2 X10(3) UL (ref 4–11)
WBC #/AREA URNS AUTO: 2 /HPF

## 2020-09-03 PROCEDURE — 83690 ASSAY OF LIPASE: CPT | Performed by: EMERGENCY MEDICINE

## 2020-09-03 PROCEDURE — 80048 BASIC METABOLIC PNL TOTAL CA: CPT | Performed by: EMERGENCY MEDICINE

## 2020-09-03 PROCEDURE — 81001 URINALYSIS AUTO W/SCOPE: CPT | Performed by: EMERGENCY MEDICINE

## 2020-09-03 PROCEDURE — 85025 COMPLETE CBC W/AUTO DIFF WBC: CPT | Performed by: EMERGENCY MEDICINE

## 2020-09-03 PROCEDURE — 99284 EMERGENCY DEPT VISIT MOD MDM: CPT

## 2020-09-03 PROCEDURE — 87186 SC STD MICRODIL/AGAR DIL: CPT | Performed by: EMERGENCY MEDICINE

## 2020-09-03 PROCEDURE — 80076 HEPATIC FUNCTION PANEL: CPT | Performed by: EMERGENCY MEDICINE

## 2020-09-03 PROCEDURE — 87086 URINE CULTURE/COLONY COUNT: CPT | Performed by: EMERGENCY MEDICINE

## 2020-09-03 PROCEDURE — 74177 CT ABD & PELVIS W/CONTRAST: CPT | Performed by: EMERGENCY MEDICINE

## 2020-09-03 PROCEDURE — 87088 URINE BACTERIA CULTURE: CPT | Performed by: EMERGENCY MEDICINE

## 2020-09-03 PROCEDURE — 87040 BLOOD CULTURE FOR BACTERIA: CPT | Performed by: EMERGENCY MEDICINE

## 2020-09-03 PROCEDURE — 36415 COLL VENOUS BLD VENIPUNCTURE: CPT

## 2020-09-03 RX ORDER — SULFAMETHOXAZOLE AND TRIMETHOPRIM 800; 160 MG/1; MG/1
1 TABLET ORAL ONCE
Status: COMPLETED | OUTPATIENT
Start: 2020-09-03 | End: 2020-09-03

## 2020-09-03 RX ORDER — LOSARTAN POTASSIUM 50 MG/1
50 TABLET ORAL DAILY
Qty: 90 TABLET | Refills: 3 | Status: SHIPPED | OUTPATIENT
Start: 2020-09-03 | End: 2021-09-02

## 2020-09-03 RX ORDER — SULFAMETHOXAZOLE AND TRIMETHOPRIM 800; 160 MG/1; MG/1
1 TABLET ORAL 2 TIMES DAILY
Qty: 14 TABLET | Refills: 0 | Status: SHIPPED | OUTPATIENT
Start: 2020-09-03 | End: 2020-09-10

## 2020-09-03 RX ORDER — LOSARTAN POTASSIUM 100 MG/1
TABLET ORAL
Qty: 45 TABLET | Refills: 3 | OUTPATIENT
Start: 2020-09-03

## 2020-09-03 NOTE — ED NOTES
Pt to ED via steady gait with c/o left lower quadrant \"dull pain\" x 3 days. States \"I felt ok but then I had a 101 fever last night so I got worried. \" Pt denies urinary complaints but states she does get UTI's. No diarrhea, vomiting. No fevers today.

## 2020-09-03 NOTE — ED PROVIDER NOTES
Patient Seen in: [facility] AND [facility] Emergency Department    History   Patient presents with:  Abdomen/Flank Pain  Fever    Stated Complaint: fever, abdominal pain     HPI    Patient complains of  llq  abdominal pain that began 3 days ago.   Pain described 70 MG Oral Tab,  Take 1 tablet by mouth once a week. triamcinolone acetonide 0.5 % External Cream,  Use bid as directed   Glucosamine-Chondroitin 500-400 MG Oral Cap,  Take 1 tablet by mouth 2 (two) times daily.    Coenzyme Q10 (COQ10 OR),  Take 1 tablet distended, tender to palpation in llq with vol guarding, no mass, no hernia appreciated, bs present  Neurological: II-XII grossly intact  no focal deficits  Skin: warm and dry, no rashes.   Musculoskeletal: neck is supple non tender        Extremities are s Date: 9/3/2020  CONCLUSION:  Few peripheral ill-defined areas of low-attenuation left kidney including the largest measuring 1.8 cm lateral lower pole. Mild associated perinephric stranding adjacent to the anterolateral lower pole left kidney.   These find MD  [address]  [phone]    Call  For infectious disease followup and evaluation.       Medications Prescribed:  Discharge Medication List as of 9/3/2020  3:37 PM    START taking these medications    Sulfamethoxazole

## 2020-09-03 NOTE — TELEPHONE ENCOUNTER
Please call pt, not feeling well this morning  Has dull ache on left side of abdomen for a few days and she is running a fever of 100-101  Please call to advise  Tasked to nursing

## 2020-09-03 NOTE — TELEPHONE ENCOUNTER
Call to patient and situation discussed. Patient to run a fever the past 3 days especially at night to 101 degrees. Patient complains of some pain in the left side of her abdomen.   Patient is also had a cough for the past few months which is productive w

## 2020-09-03 NOTE — ED PROVIDER NOTES
Signout taken with dispo pending CT for r/o diverticulitis. Labs nonacute, CT with possible pyelonephritis - patient AF/HDS, nontoxic and well-appearing without CVA/flank tenderness or bandemia/obvious UTI. Given ESBL history, case d/w ID Dr. Zhang Jacobson.   Gi interpolar region is too small to characterize left kidney AORTA/VASCULAR:   Abdominal aorta is normal in caliber. ABDOMINAL NODES: No mass or adenopathy. BOWEL/MESENTERY:  No dilated loops of bowel or definite abnormal bowel wall thickening.   Colonic div Bilirubin Urine Negative Negative    Ketones Urine Negative Negative mg/dL    Blood Urine Negative Negative    pH Urine 7.0 5.0 - 8.0    Protein Urine Negative Negative mg/dL    Urobilinogen Urine <2.0 <2.0    Nitrite Urine Negative Negative    Leukocyte E mmol/L    CO2 25.0 21.0 - 32.0 mmol/L    Anion Gap 8 0 - 18 mmol/L    BUN 22 (H) 7 - 18 mg/dL    Creatinine 1.18 (H) 0.55 - 1.02 mg/dL    BUN/CREA Ratio 18.6 10.0 - 20.0    Calcium, Total 9.6 8.5 - 10.1 mg/dL    Calculated Osmolality 285 275 - 295 mOsm/kg

## 2020-09-03 NOTE — TELEPHONE ENCOUNTER
To Dr. Christin Reis - see pt message below. Dull ache left side of abd, directly to left umbilicus  - \"really not troublesome\". But concerned about fever. Fever/abd discomfort onset:3 days - highest temp was 101 taking advil so temp has been around 100.

## 2020-09-04 NOTE — TELEPHONE ENCOUNTER
Call to patient and situation discussed. Patient was sent to the emergency room yesterday for evaluation of her fever and abdominal pain and slight cough. Patient CBC showed her white count to be elevated at about 12,200.   Patient had renal insufficiency

## 2020-09-04 NOTE — TELEPHONE ENCOUNTER
I spoke with patient. She is feeling much better today. No fever. She still has a dull abdominal pain. She was told she has a kidney infection. Looks like blood cultures are pending. Urine culture result is in Epic. Please review.  She was started on [facility]

## 2020-10-06 ENCOUNTER — TELEPHONE (OUTPATIENT)
Dept: INTERNAL MEDICINE CLINIC | Facility: CLINIC | Age: 65
End: 2020-10-06

## 2020-10-06 DIAGNOSIS — N39.0 URINARY TRACT INFECTION WITHOUT HEMATURIA, SITE UNSPECIFIED: Primary | ICD-10-CM

## 2020-10-06 DIAGNOSIS — N30.00 ACUTE CYSTITIS WITHOUT HEMATURIA: ICD-10-CM

## 2020-10-06 NOTE — TELEPHONE ENCOUNTER
Patient was in the ER on 9/3/2020 and was informed to follow up with Dr Aisha Orona. Patient called to do so but then went back on her request and stated that she would like Dr Aisha Orona to look over the finds in her chart and call her back.  Patient states s

## 2020-10-08 NOTE — TELEPHONE ENCOUNTER
Telephone call to patient and situation discussed. I reviewed the CT that was done when she was in the emergency room on September 3.   The CT scan that was done on September 3 showed there to be some possible stranding involving the left kidney which is s

## 2020-10-09 ENCOUNTER — APPOINTMENT (OUTPATIENT)
Dept: LAB | Age: 65
End: 2020-10-09
Attending: INTERNAL MEDICINE
Payer: MEDICARE

## 2020-10-09 ENCOUNTER — TELEPHONE (OUTPATIENT)
Dept: INTERNAL MEDICINE CLINIC | Facility: CLINIC | Age: 65
End: 2020-10-09

## 2020-10-09 PROCEDURE — 81001 URINALYSIS AUTO W/SCOPE: CPT | Performed by: INTERNAL MEDICINE

## 2020-10-09 NOTE — TELEPHONE ENCOUNTER
Telephone call to patient. Patient's repeat urinalysis is turned out well. She has 5 WBCs. A urine culture was not done. Patient has had an ESBL urinary tract infection when she was in the emergency room in early September.   Patient also had some abnor

## 2020-10-30 RX ORDER — LOSARTAN POTASSIUM 100 MG/1
TABLET ORAL
Qty: 45 TABLET | Refills: 0 | OUTPATIENT
Start: 2020-10-30

## 2020-11-04 ENCOUNTER — HOSPITAL ENCOUNTER (OUTPATIENT)
Dept: CT IMAGING | Age: 65
Discharge: HOME | End: 2020-11-04
Attending: UROLOGY
Payer: MEDICARE

## 2020-11-04 DIAGNOSIS — N28.9 RENAL LESION: ICD-10-CM

## 2020-11-04 DIAGNOSIS — N39.0 RECURRENT UTI: ICD-10-CM

## 2020-11-04 PROCEDURE — 76377 3D RENDER W/INTRP POSTPROCES: CPT | Performed by: UROLOGY

## 2020-11-04 PROCEDURE — 74178 CT ABD&PLV WO CNTR FLWD CNTR: CPT | Performed by: UROLOGY

## 2020-11-04 PROCEDURE — 82565 ASSAY OF CREATININE: CPT

## 2021-05-17 RX ORDER — AMLODIPINE BESYLATE 5 MG/1
TABLET ORAL
Qty: 90 TABLET | Refills: 0 | Status: SHIPPED | OUTPATIENT
Start: 2021-05-17 | End: 2021-08-09

## 2021-05-17 NOTE — TELEPHONE ENCOUNTER
Patient is overdue for 6 month follow up with Dr. Elizabeth Archer. Fasting blood work orders are already ordered for the patient. Please call patient to schedule and then route back to clinical for the pended refill.

## 2021-05-21 ENCOUNTER — LAB ENCOUNTER (OUTPATIENT)
Dept: LAB | Age: 66
End: 2021-05-21
Attending: INTERNAL MEDICINE
Payer: MEDICARE

## 2021-05-21 DIAGNOSIS — E55.9 VITAMIN D DEFICIENCY: ICD-10-CM

## 2021-05-21 DIAGNOSIS — N18.30 STAGE 3 CHRONIC KIDNEY DISEASE (HCC): ICD-10-CM

## 2021-05-21 DIAGNOSIS — E78.9 BORDERLINE HIGH CHOLESTEROL: ICD-10-CM

## 2021-05-21 PROCEDURE — 36415 COLL VENOUS BLD VENIPUNCTURE: CPT

## 2021-05-21 PROCEDURE — 82306 VITAMIN D 25 HYDROXY: CPT

## 2021-05-21 PROCEDURE — 80048 BASIC METABOLIC PNL TOTAL CA: CPT

## 2021-05-21 PROCEDURE — 80061 LIPID PANEL: CPT

## 2021-05-26 ENCOUNTER — OFFICE VISIT (OUTPATIENT)
Dept: INTERNAL MEDICINE CLINIC | Facility: CLINIC | Age: 66
End: 2021-05-26
Payer: MEDICARE

## 2021-05-26 VITALS
WEIGHT: 146 LBS | SYSTOLIC BLOOD PRESSURE: 130 MMHG | BODY MASS INDEX: 24.32 KG/M2 | HEIGHT: 65 IN | TEMPERATURE: 98 F | DIASTOLIC BLOOD PRESSURE: 80 MMHG | OXYGEN SATURATION: 98 % | HEART RATE: 92 BPM

## 2021-05-26 DIAGNOSIS — M15.9 PRIMARY OSTEOARTHRITIS INVOLVING MULTIPLE JOINTS: ICD-10-CM

## 2021-05-26 DIAGNOSIS — Z96.651 S/P REVISION OF TOTAL KNEE, RIGHT: ICD-10-CM

## 2021-05-26 DIAGNOSIS — M85.88 OSTEOPENIA OF SPINE: ICD-10-CM

## 2021-05-26 DIAGNOSIS — M81.0 AGE-RELATED OSTEOPOROSIS WITHOUT CURRENT PATHOLOGICAL FRACTURE: ICD-10-CM

## 2021-05-26 DIAGNOSIS — E78.9 BORDERLINE HIGH CHOLESTEROL: ICD-10-CM

## 2021-05-26 DIAGNOSIS — R53.83 FATIGUE, UNSPECIFIED TYPE: ICD-10-CM

## 2021-05-26 DIAGNOSIS — N18.31 STAGE 3A CHRONIC KIDNEY DISEASE (HCC): ICD-10-CM

## 2021-05-26 DIAGNOSIS — Z96.652 HISTORY OF TOTAL LEFT KNEE REPLACEMENT: ICD-10-CM

## 2021-05-26 DIAGNOSIS — R00.2 PALPITATIONS: Primary | ICD-10-CM

## 2021-05-26 DIAGNOSIS — I10 ESSENTIAL HYPERTENSION: ICD-10-CM

## 2021-05-26 PROBLEM — E55.9 VITAMIN D DEFICIENCY: Status: ACTIVE | Noted: 2021-05-26

## 2021-05-26 PROBLEM — M15.0 PRIMARY OSTEOARTHRITIS INVOLVING MULTIPLE JOINTS: Status: ACTIVE | Noted: 2021-05-26

## 2021-05-26 PROCEDURE — 99214 OFFICE O/P EST MOD 30 MIN: CPT | Performed by: INTERNAL MEDICINE

## 2021-05-26 PROCEDURE — 93000 ELECTROCARDIOGRAM COMPLETE: CPT | Performed by: INTERNAL MEDICINE

## 2021-05-26 NOTE — PATIENT INSTRUCTIONS
1.  Patient is to continue her current diet, medication and activity. 2.  Patient has had her Covid vaccines. 3.  I will obtain a 24-hour Holter monitor on the patient to assess her for cardiac arrhythmias.   4.  I will see the patient back in 1 month wit

## 2021-05-26 NOTE — PROGRESS NOTES
Saralee Fothergill is a 74year old female. Patient presents with:  Checkup  Hypertension  Arthritis    HPI:   Patient presents with:  Checkup  Hypertension  Arthritis    Patient is seen today for evaluation. Patient has been feeling well.   However she has ha Medical History:   Diagnosis Date   • Asthma     mother   • Emphysema lung [facility])     mother   • Hearing impairment     has hearing aids   • Lung nodule 2005   • Osteoarthrosis, unspecified whether generalized or localized, unspecified site 2004   • Lenny Shaikh before. Patient's EKG has NSSTTWC in lead aVL which is unchanged. I have placed an order in the system for the patient to get a 24-hour Holter monitor and also a CBC. I will see the patient back in 1 month.   I will see the patient back sooner as necessa month as noted above. Danny Manrique MD  5/26/2021  5:14 PM

## 2021-06-02 ENCOUNTER — HOSPITAL ENCOUNTER (OUTPATIENT)
Dept: CV DIAGNOSTICS | Facility: HOSPITAL | Age: 66
Discharge: HOME | End: 2021-06-02
Attending: INTERNAL MEDICINE
Payer: MEDICARE

## 2021-06-02 DIAGNOSIS — R00.2 PALPITATIONS: ICD-10-CM

## 2021-06-02 DIAGNOSIS — R53.83 FATIGUE, UNSPECIFIED TYPE: ICD-10-CM

## 2021-06-02 PROCEDURE — 93227 XTRNL ECG REC<48 HR R&I: CPT | Performed by: INTERNAL MEDICINE

## 2021-06-02 PROCEDURE — 93225 XTRNL ECG REC<48 HRS REC: CPT | Performed by: INTERNAL MEDICINE

## 2021-06-18 ENCOUNTER — TELEPHONE (OUTPATIENT)
Dept: INTERNAL MEDICINE CLINIC | Facility: CLINIC | Age: 66
End: 2021-06-18

## 2021-06-18 NOTE — TELEPHONE ENCOUNTER
Telephone call to patient and situation discussed. Patient had a hold to monitor performed which has come back essentially normal.  Patient shows rare PACs and rare PVCs. No sustained atrial or ventricular tachycardia.   Patient is feeling well at this ti

## 2021-07-06 NOTE — H&P
[address] [facility] [address] Gastroenterology                                                                                                  Clinic History and Physical     Pa or localized, unspecified site 2004   • Osteopenia 2003   • Sleep apnea     oral device   • Unspecified essential hypertension    • Uses hearing aid    • Visual impairment     wears contact lens      Past Surgical History:   Procedure Laterality Date   • C Oral Tab Take 1 tablet by mouth once a week. 3   • triamcinolone acetonide 0.5 % External Cream Use bid as directed 60 g 1   • Glucosamine-Chondroitin 500-400 MG Oral Cap Take 1 tablet by mouth 2 (two) times daily.      • Coenzyme Q10 (COQ10 OR) Take 1 tab discussed with patient today. See HPI and A&P for further details.       ASSESSMENT/PLAN:   Nelson Burns is a 74year old year-old female pt of Dr. Rocio Briggs with history of asthma, emphysema, OA, sleep apnea, osteopenia, hypertension, who presents for the risks (including risk of delayed/missed diagnosis), benefits, and alternatives to colonoscopy with the patient [who demonstrated understanding], including but not limited to the risks of bleeding, infection, pain, as well as the risks of anesthesia and

## 2021-07-19 ENCOUNTER — TELEPHONE (OUTPATIENT)
Dept: GASTROENTEROLOGY | Facility: CLINIC | Age: 66
End: 2021-07-19

## 2021-07-19 ENCOUNTER — OFFICE VISIT (OUTPATIENT)
Dept: GASTROENTEROLOGY | Facility: CLINIC | Age: 66
End: 2021-07-19
Payer: MEDICARE

## 2021-07-19 VITALS
HEIGHT: 65 IN | HEART RATE: 84 BPM | WEIGHT: 147 LBS | BODY MASS INDEX: 24.49 KG/M2 | SYSTOLIC BLOOD PRESSURE: 120 MMHG | DIASTOLIC BLOOD PRESSURE: 70 MMHG

## 2021-07-19 DIAGNOSIS — Z12.11 COLON CANCER SCREENING: Primary | ICD-10-CM

## 2021-07-19 DIAGNOSIS — Z80.0 FAMILY HISTORY OF COLON CANCER: Primary | ICD-10-CM

## 2021-07-19 DIAGNOSIS — Z12.11 SCREENING FOR COLON CANCER: ICD-10-CM

## 2021-07-19 DIAGNOSIS — Z80.0 FAMILY HISTORY OF COLON CANCER: ICD-10-CM

## 2021-07-19 DIAGNOSIS — G47.30 SLEEP APNEA, UNSPECIFIED TYPE: ICD-10-CM

## 2021-07-19 PROCEDURE — 99203 OFFICE O/P NEW LOW 30 MIN: CPT | Performed by: NURSE PRACTITIONER

## 2021-07-19 RX ORDER — POLYETHYLENE GLYCOL 3350, SODIUM CHLORIDE, SODIUM BICARBONATE, POTASSIUM CHLORIDE 420; 11.2; 5.72; 1.48 G/4L; G/4L; G/4L; G/4L
POWDER, FOR SOLUTION ORAL
Qty: 4000 ML | Refills: 0 | Status: SHIPPED | OUTPATIENT
Start: 2021-07-19 | End: 2022-02-04

## 2021-07-19 NOTE — PATIENT INSTRUCTIONS
-Schedule colonoscopy w/ Dr. Rashaun Blanco with MAC or Dr. Eliceo Blancas with IV twilight or MAC  Dx: screening, [address] [facility]   -Eligible for NE: No r/t history of ANIRUDH  -Prep: Split dose Colyte/TriLyte or equivalent  -Anti-platelets and anti-coagulants: ASA-continue as pre

## 2021-07-19 NOTE — TELEPHONE ENCOUNTER
Scheduled for:  Colonoscopy 18867    Provider Name:    Date:  10/29/21  Location:   [facility]   Sedation:  Mac   Time:  0900 Am(pt is aware that [facility] will call the day before to confirm arrival time)     Prep: Trilyte  Prep instructions were given

## 2021-07-21 ENCOUNTER — TELEPHONE (OUTPATIENT)
Dept: GASTROENTEROLOGY | Facility: CLINIC | Age: 66
End: 2021-07-21

## 2021-07-21 NOTE — TELEPHONE ENCOUNTER
Current Outpatient Medications   Medication Sig Dispense Refill   • PEG 3350-KCl-Na Bicarb-NaCl (TRILYTE) 420 g Oral Recon Soln Take prep as directed by gastro office.  May substitute with Trilyte/generic equivalent if needed 4000 mL 0       Not available-

## 2021-07-21 NOTE — TELEPHONE ENCOUNTER
Pharmacist states Trilyte not available at [facility] but they do have Golytely in stock. Verbal approval given.

## 2021-07-22 ENCOUNTER — LAB ENCOUNTER (OUTPATIENT)
Dept: LAB | Age: 66
End: 2021-07-22
Attending: INTERNAL MEDICINE
Payer: MEDICARE

## 2021-07-22 DIAGNOSIS — R00.2 PALPITATIONS: ICD-10-CM

## 2021-07-22 DIAGNOSIS — R53.83 FATIGUE, UNSPECIFIED TYPE: ICD-10-CM

## 2021-07-22 LAB
BASOPHILS # BLD AUTO: 0.05 X10(3) UL (ref 0–0.2)
BASOPHILS NFR BLD AUTO: 0.7 %
DEPRECATED RDW RBC AUTO: 45.2 FL (ref 35.1–46.3)
EOSINOPHIL # BLD AUTO: 0.19 X10(3) UL (ref 0–0.7)
EOSINOPHIL NFR BLD AUTO: 2.7 %
ERYTHROCYTE [DISTWIDTH] IN BLOOD BY AUTOMATED COUNT: 13.1 % (ref 11–15)
HCT VFR BLD AUTO: 35 %
HGB BLD-MCNC: 11.4 G/DL
IMM GRANULOCYTES # BLD AUTO: 0.03 X10(3) UL (ref 0–1)
IMM GRANULOCYTES NFR BLD: 0.4 %
LYMPHOCYTES # BLD AUTO: 2.07 X10(3) UL (ref 1–4)
LYMPHOCYTES NFR BLD AUTO: 29 %
MCH RBC QN AUTO: 30.9 PG (ref 26–34)
MCHC RBC AUTO-ENTMCNC: 32.6 G/DL (ref 31–37)
MCV RBC AUTO: 94.9 FL
MONOCYTES # BLD AUTO: 0.59 X10(3) UL (ref 0.1–1)
MONOCYTES NFR BLD AUTO: 8.3 %
NEUTROPHILS # BLD AUTO: 4.21 X10 (3) UL (ref 1.5–7.7)
NEUTROPHILS # BLD AUTO: 4.21 X10(3) UL (ref 1.5–7.7)
NEUTROPHILS NFR BLD AUTO: 58.9 %
PLATELET # BLD AUTO: 306 10(3)UL (ref 150–450)
RBC # BLD AUTO: 3.69 X10(6)UL
WBC # BLD AUTO: 7.1 X10(3) UL (ref 4–11)

## 2021-07-22 PROCEDURE — 85025 COMPLETE CBC W/AUTO DIFF WBC: CPT

## 2021-07-22 PROCEDURE — 36415 COLL VENOUS BLD VENIPUNCTURE: CPT

## 2021-07-28 ENCOUNTER — HOSPITAL ENCOUNTER (EMERGENCY)
Facility: HOSPITAL | Age: 66
Discharge: HOME | End: 2021-07-28
Attending: EMERGENCY MEDICINE
Payer: MEDICARE

## 2021-07-28 ENCOUNTER — APPOINTMENT (OUTPATIENT)
Dept: CT IMAGING | Facility: HOSPITAL | Age: 66
End: 2021-07-28
Attending: EMERGENCY MEDICINE
Payer: MEDICARE

## 2021-07-28 ENCOUNTER — TELEPHONE (OUTPATIENT)
Dept: INTERNAL MEDICINE CLINIC | Facility: CLINIC | Age: 66
End: 2021-07-28

## 2021-07-28 VITALS
TEMPERATURE: 98 F | BODY MASS INDEX: 24 KG/M2 | DIASTOLIC BLOOD PRESSURE: 73 MMHG | WEIGHT: 145.5 LBS | HEART RATE: 90 BPM | OXYGEN SATURATION: 99 % | RESPIRATION RATE: 18 BRPM | SYSTOLIC BLOOD PRESSURE: 120 MMHG

## 2021-07-28 DIAGNOSIS — K57.92 ACUTE DIVERTICULITIS: Primary | ICD-10-CM

## 2021-07-28 LAB
ANION GAP SERPL CALC-SCNC: 7 MMOL/L (ref 0–18)
BASOPHILS # BLD AUTO: 0.04 X10(3) UL (ref 0–0.2)
BASOPHILS NFR BLD AUTO: 0.4 %
BUN BLD-MCNC: 19 MG/DL (ref 7–18)
BUN/CREAT SERPL: 18.4 (ref 10–20)
CALCIUM BLD-MCNC: 10 MG/DL (ref 8.5–10.1)
CHLORIDE SERPL-SCNC: 104 MMOL/L (ref 98–112)
CO2 SERPL-SCNC: 27 MMOL/L (ref 21–32)
CREAT BLD-MCNC: 1.03 MG/DL
DEPRECATED RDW RBC AUTO: 45.1 FL (ref 35.1–46.3)
EOSINOPHIL # BLD AUTO: 0.1 X10(3) UL (ref 0–0.7)
EOSINOPHIL NFR BLD AUTO: 0.9 %
ERYTHROCYTE [DISTWIDTH] IN BLOOD BY AUTOMATED COUNT: 13.2 % (ref 11–15)
GLUCOSE BLD-MCNC: 86 MG/DL (ref 70–99)
HCT VFR BLD AUTO: 36.3 %
HGB BLD-MCNC: 11.9 G/DL
IMM GRANULOCYTES # BLD AUTO: 0.05 X10(3) UL (ref 0–1)
IMM GRANULOCYTES NFR BLD: 0.4 %
LYMPHOCYTES # BLD AUTO: 1.7 X10(3) UL (ref 1–4)
LYMPHOCYTES NFR BLD AUTO: 15 %
MCH RBC QN AUTO: 30.7 PG (ref 26–34)
MCHC RBC AUTO-ENTMCNC: 32.8 G/DL (ref 31–37)
MCV RBC AUTO: 93.8 FL
MONOCYTES # BLD AUTO: 1.07 X10(3) UL (ref 0.1–1)
MONOCYTES NFR BLD AUTO: 9.5 %
NEUTROPHILS # BLD AUTO: 8.36 X10 (3) UL (ref 1.5–7.7)
NEUTROPHILS # BLD AUTO: 8.36 X10(3) UL (ref 1.5–7.7)
NEUTROPHILS NFR BLD AUTO: 73.8 %
OSMOLALITY SERPL CALC.SUM OF ELEC: 288 MOSM/KG (ref 275–295)
PLATELET # BLD AUTO: 328 10(3)UL (ref 150–450)
POTASSIUM SERPL-SCNC: 3.5 MMOL/L (ref 3.5–5.1)
RBC # BLD AUTO: 3.87 X10(6)UL
SODIUM SERPL-SCNC: 138 MMOL/L (ref 136–145)
WBC # BLD AUTO: 11.3 X10(3) UL (ref 4–11)

## 2021-07-28 PROCEDURE — 80048 BASIC METABOLIC PNL TOTAL CA: CPT

## 2021-07-28 PROCEDURE — 80048 BASIC METABOLIC PNL TOTAL CA: CPT | Performed by: EMERGENCY MEDICINE

## 2021-07-28 PROCEDURE — 85025 COMPLETE CBC W/AUTO DIFF WBC: CPT

## 2021-07-28 PROCEDURE — 99284 EMERGENCY DEPT VISIT MOD MDM: CPT

## 2021-07-28 PROCEDURE — 74177 CT ABD & PELVIS W/CONTRAST: CPT | Performed by: EMERGENCY MEDICINE

## 2021-07-28 PROCEDURE — 85025 COMPLETE CBC W/AUTO DIFF WBC: CPT | Performed by: EMERGENCY MEDICINE

## 2021-07-28 PROCEDURE — 36415 COLL VENOUS BLD VENIPUNCTURE: CPT

## 2021-07-28 RX ORDER — SACCHAROMYCES BOULARDII 250 MG
250 CAPSULE ORAL 2 TIMES DAILY
Qty: 28 CAPSULE | Refills: 0 | Status: SHIPPED | OUTPATIENT
Start: 2021-07-28 | End: 2021-08-11

## 2021-07-28 RX ORDER — AMOXICILLIN AND CLAVULANATE POTASSIUM 875; 125 MG/1; MG/1
1 TABLET, FILM COATED ORAL 2 TIMES DAILY
Qty: 20 TABLET | Refills: 0 | Status: SHIPPED | OUTPATIENT
Start: 2021-07-28 | End: 2021-08-07

## 2021-07-28 NOTE — TELEPHONE ENCOUNTER
GABY to Dr. GRAHAM---    Pt called this morning as she had an appt with you at 1430 today reporting \"severe abdominal pain\" since yesterday and a temp running between 100-100.5.  Pt reported to RN that severe pain is intermittent and at worst flares up to 9/10,

## 2021-07-28 NOTE — ED PROVIDER NOTES
Patient Seen in: [facility] AND [facility] Emergency Department    History   No chief complaint on file. Stated Complaint: Fever    HPI      History of present illness:   Pt complains of RLQ pain that began last night, low grade temp.   Pain rated as 6/10, sharp 6 (six) hours as needed for Pain. alendronate 70 MG Oral Tab,  Take 1 tablet by mouth once a week.    triamcinolone acetonide 0.5 % External Cream,  Use bid as directed   Glucosamine-Chondroitin 500-400 MG Oral Cap,  Take 1 tablet by mouth 2 (two) times d tender over McBurney's, mild voluntary guarding, no masses normal bowel sounds   Neurological: II-XII grossly intact  no focal deficits  Skin: warm and dry, no rashes.   Musculoskeletal: neck is supple non tender        Extremities are symmetrical, full ran Dictated by (CST): Kirstin Stern MD on 7/28/2021 at 4:02 PM     Finalized by (CST): Kirstin Stern MD on 7/28/2021 at 4:10 PM                MDM      Feeling ok with trial of outpt mgmt with fu colonoscopy in future                             Disposition

## 2021-07-28 NOTE — TELEPHONE ENCOUNTER
Pt is calling and states that running a low fever between 100-100.5 and feeling severe abdominal pains. Pt is asking if she should still come in for her 2:30 appt with Dr. Aisha Orona.    Please advise

## 2021-07-29 NOTE — TELEPHONE ENCOUNTER
Patient was seen the emergency room where she was found to have acute diverticulitis. Patient is now being treated with Augmentin. She has an appointment with Dr. Bo Hensley in the future for colonoscopy.   I have advised her to wait at least 4 to 6 wee

## 2021-07-29 NOTE — TELEPHONE ENCOUNTER
Noted, pt spoke to MD regarding condition update in note below. Per MD note, pt to call if symptoms return or worsen.

## 2021-08-09 RX ORDER — AMLODIPINE BESYLATE 5 MG/1
TABLET ORAL
Qty: 90 TABLET | Refills: 3 | Status: SHIPPED | OUTPATIENT
Start: 2021-08-09

## 2021-09-02 RX ORDER — TRIAMTERENE AND HYDROCHLOROTHIAZIDE 37.5; 25 MG/1; MG/1
CAPSULE ORAL
Qty: 90 CAPSULE | Refills: 3 | Status: SHIPPED | OUTPATIENT
Start: 2021-09-02

## 2021-09-02 RX ORDER — LOSARTAN POTASSIUM 50 MG/1
TABLET ORAL
Qty: 90 TABLET | Refills: 3 | Status: SHIPPED | OUTPATIENT
Start: 2021-09-02

## 2021-10-11 ENCOUNTER — HOSPITAL ENCOUNTER (OUTPATIENT)
Dept: MAMMOGRAPHY | Age: 66
Discharge: HOME | End: 2021-10-11
Attending: OBSTETRICS & GYNECOLOGY
Payer: MEDICARE

## 2021-10-11 DIAGNOSIS — Z12.31 ENCOUNTER FOR SCREENING MAMMOGRAM FOR MALIGNANT NEOPLASM OF BREAST: ICD-10-CM

## 2021-10-26 ENCOUNTER — LAB REQUISITION (OUTPATIENT)
Dept: SURGERY | Age: 66
End: 2021-10-26
Payer: MEDICARE

## 2021-10-26 DIAGNOSIS — Z01.818 PREOP EXAMINATION: ICD-10-CM

## 2021-10-29 ENCOUNTER — LAB REQUISITION (OUTPATIENT)
Dept: SURGERY | Age: 66
End: 2021-10-29
Payer: MEDICARE

## 2021-10-29 ENCOUNTER — SURGERY CENTER DOCUMENTATION (OUTPATIENT)
Dept: SURGERY | Age: 66
End: 2021-10-29

## 2021-10-29 DIAGNOSIS — K57.92 DIVERTICULITIS: ICD-10-CM

## 2021-10-29 DIAGNOSIS — Z12.11 SCREEN FOR COLON CANCER: ICD-10-CM

## 2021-10-29 PROCEDURE — 45385 COLONOSCOPY W/LESION REMOVAL: CPT | Performed by: INTERNAL MEDICINE

## 2021-10-29 PROCEDURE — 88305 TISSUE EXAM BY PATHOLOGIST: CPT | Performed by: INTERNAL MEDICINE

## 2021-10-29 NOTE — PROCEDURES
[address] Endoscopy Report      Date of Procedure:  10/29/21      Preoperative Diagnosis:  1. Colorectal cancer screening  2. Personal history of sigmoid colon diverticulitis      Postoperative Diagnosis:  1. Colon polyps  2. retrieved. 2.  In the ascending colon there was a 4-5 mm sessile polyp which was cold snare excised and retrieved. Inspection of all sites revealed no evidence of ongoing bleeding.   There was an occasional diverticulum seen in the descending colon and

## 2021-11-03 ENCOUNTER — TELEPHONE (OUTPATIENT)
Dept: GASTROENTEROLOGY | Facility: CLINIC | Age: 66
End: 2021-11-03

## 2021-11-03 NOTE — TELEPHONE ENCOUNTER
----- Message from Karly Bermeo MD sent at 11/2/2021  6:32 PM CDT -----  I spoke to the patient. She is feeling well. She had #3 adenomatous polyps removed (traditional and serrated). I have discussed the significance.  The sigmoid biopsy was consis

## 2021-11-03 NOTE — TELEPHONE ENCOUNTER
Entered into [facility]. Recall CLN in 3 years per Dr. Jose Angel Guerra. Last CLN done 10/29/2021. Recall entered into Patient Outreach for 10/29/2024. HM updated.

## 2021-11-10 ENCOUNTER — HOSPITAL ENCOUNTER (OUTPATIENT)
Dept: MAMMOGRAPHY | Age: 66
Discharge: HOME | End: 2021-11-10
Attending: OBSTETRICS & GYNECOLOGY
Payer: MEDICARE

## 2021-11-10 PROCEDURE — 77063 BREAST TOMOSYNTHESIS BI: CPT | Performed by: OBSTETRICS & GYNECOLOGY

## 2021-11-10 PROCEDURE — 77067 SCR MAMMO BI INCL CAD: CPT | Performed by: OBSTETRICS & GYNECOLOGY

## 2022-02-04 ENCOUNTER — OFFICE VISIT (OUTPATIENT)
Dept: DERMATOLOGY CLINIC | Facility: CLINIC | Age: 67
End: 2022-02-04
Payer: MEDICARE

## 2022-02-04 DIAGNOSIS — L82.1 SEBORRHEIC KERATOSES: ICD-10-CM

## 2022-02-04 DIAGNOSIS — L30.9 DERMATITIS: ICD-10-CM

## 2022-02-04 DIAGNOSIS — L82.0 INFLAMED SEBORRHEIC KERATOSIS: Primary | ICD-10-CM

## 2022-02-04 DIAGNOSIS — D23.9 BENIGN NEOPLASM OF SKIN, UNSPECIFIED LOCATION: ICD-10-CM

## 2022-02-04 PROCEDURE — 99203 OFFICE O/P NEW LOW 30 MIN: CPT | Performed by: DERMATOLOGY

## 2022-02-04 RX ORDER — TRIAMCINOLONE ACETONIDE 5 MG/G
CREAM TOPICAL
Qty: 60 G | Refills: 1 | Status: SHIPPED | OUTPATIENT
Start: 2022-02-04 | End: 2022-02-04

## 2022-02-04 RX ORDER — CHOLECALCIFEROL (VITAMIN D3) 50 MCG
TABLET ORAL
COMMUNITY
Start: 2021-04-01

## 2022-02-04 RX ORDER — CALCIUM CARBONATE/VITAMIN D3 600MG-62.5
CAPSULE ORAL
COMMUNITY
Start: 2021-09-12

## 2022-02-04 RX ORDER — SACCHAROMYCES BOULARDII 250 MG
CAPSULE ORAL
COMMUNITY

## 2022-02-04 RX ORDER — TRIAMCINOLONE ACETONIDE 5 MG/G
CREAM TOPICAL
Qty: 60 G | Refills: 1 | Status: SHIPPED | OUTPATIENT
Start: 2022-02-04

## 2022-02-04 RX ORDER — ESTRADIOL 0.1 MG/G
CREAM VAGINAL
COMMUNITY
Start: 2021-09-28

## 2022-08-04 NOTE — TELEPHONE ENCOUNTER
To FD please assist in scheduling PE then route back to RX, thank you, pt hasnt been seen since May 2021

## 2022-09-02 RX ORDER — AMLODIPINE BESYLATE 5 MG/1
5 TABLET ORAL DAILY
Qty: 90 TABLET | Refills: 0 | Status: SHIPPED | OUTPATIENT
Start: 2022-09-02

## 2022-09-02 RX ORDER — LOSARTAN POTASSIUM 50 MG/1
TABLET ORAL
Qty: 90 TABLET | Refills: 0 | Status: SHIPPED | OUTPATIENT
Start: 2022-09-02

## 2022-09-02 RX ORDER — TRIAMTERENE AND HYDROCHLOROTHIAZIDE 37.5; 25 MG/1; MG/1
CAPSULE ORAL
Qty: 90 CAPSULE | Refills: 0 | Status: SHIPPED | OUTPATIENT
Start: 2022-09-02

## 2022-09-02 RX ORDER — AMLODIPINE BESYLATE 5 MG/1
TABLET ORAL
Qty: 90 TABLET | Refills: 0 | Status: SHIPPED
Start: 2022-09-02 | End: 2022-09-02

## 2022-09-02 NOTE — TELEPHONE ENCOUNTER
To Denilson Gannon, pt last seen 5/26/21, last actual PE was July 2020. She did make an appt for 11/10/22.      Please advise on request

## 2022-09-02 NOTE — TELEPHONE ENCOUNTER
To Angie Duong, pt last seen 5/26/21, last actual PE was July 2020. She did make an appt for 11/10/22.      Please advise on request

## 2022-09-02 NOTE — TELEPHONE ENCOUNTER
Pt scheduled Annual for 11/10/22, first available appt on Dr Early Lab schedule  Pt is out of medication  Tasked to [employer]

## 2022-09-02 NOTE — TELEPHONE ENCOUNTER
Pt called regarding refills  Pt scheduled Annual for 11/10/22, first available appt on Dr Trevin Moe schedule  Pt is low on medication  Tasked to [employer]

## 2022-09-21 ENCOUNTER — HOSPITAL ENCOUNTER (OUTPATIENT)
Dept: BONE DENSITY | Age: 67
Discharge: HOME | End: 2022-09-21
Attending: INTERNAL MEDICINE

## 2022-09-21 DIAGNOSIS — M81.0 AGE-RELATED OSTEOPOROSIS WITHOUT CURRENT PATHOLOGICAL FRACTURE: ICD-10-CM

## 2022-09-21 PROCEDURE — 77080 DXA BONE DENSITY AXIAL: CPT | Performed by: INTERNAL MEDICINE

## 2022-11-29 RX ORDER — TRIAMTERENE AND HYDROCHLOROTHIAZIDE 37.5; 25 MG/1; MG/1
CAPSULE ORAL
Qty: 90 CAPSULE | Refills: 0 | OUTPATIENT
Start: 2022-11-29

## 2022-12-08 RX ORDER — LOSARTAN POTASSIUM 50 MG/1
50 TABLET ORAL DAILY
COMMUNITY

## 2022-12-08 RX ORDER — AMOXICILLIN 500 MG/1
500 CAPSULE ORAL 3 TIMES DAILY
COMMUNITY

## 2022-12-08 RX ORDER — ALENDRONATE SODIUM 70 MG/1
70 TABLET ORAL
COMMUNITY

## 2022-12-08 RX ORDER — CALCIUM CARBONATE/VITAMIN D3 500 MG-10
TABLET ORAL
COMMUNITY

## 2022-12-08 RX ORDER — TRIAMTERENE AND HYDROCHLOROTHIAZIDE 37.5; 25 MG/1; MG/1
1 CAPSULE ORAL EVERY MORNING
COMMUNITY

## 2022-12-08 RX ORDER — COLD-HOT PACK
EACH MISCELLANEOUS
COMMUNITY

## 2022-12-08 RX ORDER — ESTRADIOL 0.1 MG/G
2 CREAM VAGINAL DAILY
COMMUNITY
End: 2023-01-19 | Stop reason: SDUPTHER

## 2023-01-19 ENCOUNTER — OFFICE VISIT (OUTPATIENT)
Dept: OBGYN | Age: 68
End: 2023-01-19

## 2023-01-19 VITALS
DIASTOLIC BLOOD PRESSURE: 80 MMHG | BODY MASS INDEX: 23.84 KG/M2 | HEART RATE: 77 BPM | HEIGHT: 65 IN | WEIGHT: 143.1 LBS | OXYGEN SATURATION: 98 % | SYSTOLIC BLOOD PRESSURE: 131 MMHG | TEMPERATURE: 98.1 F

## 2023-01-19 DIAGNOSIS — Z01.419 ENCOUNTER FOR ROUTINE GYNECOLOGIC EXAMINATION IN MEDICARE PATIENT: Primary | ICD-10-CM

## 2023-01-19 DIAGNOSIS — Z12.31 ENCOUNTER FOR SCREENING MAMMOGRAM FOR MALIGNANT NEOPLASM OF BREAST: ICD-10-CM

## 2023-01-19 DIAGNOSIS — Z12.4 SCREENING FOR MALIGNANT NEOPLASM OF CERVIX: ICD-10-CM

## 2023-01-19 DIAGNOSIS — Z01.419 GYNECOLOGIC EXAM NORMAL: ICD-10-CM

## 2023-01-19 PROCEDURE — G0101 CA SCREEN;PELVIC/BREAST EXAM: HCPCS | Performed by: OBSTETRICS & GYNECOLOGY

## 2023-01-19 PROCEDURE — 88175 CYTOPATH C/V AUTO FLUID REDO: CPT | Performed by: CLINICAL MEDICAL LABORATORY

## 2023-01-19 RX ORDER — ATORVASTATIN CALCIUM 20 MG/1
20 TABLET, FILM COATED ORAL DAILY
COMMUNITY
Start: 2022-12-18

## 2023-01-19 RX ORDER — DIFLORASONE DIACETATE 0.5 MG/G
OINTMENT TOPICAL
COMMUNITY

## 2023-01-19 RX ORDER — ESTRADIOL 0.1 MG/G
CREAM VAGINAL
COMMUNITY
Start: 2022-09-21

## 2023-01-19 RX ORDER — DILTIAZEM HYDROCHLORIDE 240 MG/1
240 CAPSULE, EXTENDED RELEASE ORAL
COMMUNITY

## 2023-01-19 RX ORDER — AMLODIPINE BESYLATE 5 MG/1
1 TABLET ORAL DAILY
COMMUNITY
Start: 2022-09-02 | End: 2023-01-19 | Stop reason: SDUPTHER

## 2023-01-19 ASSESSMENT — PATIENT HEALTH QUESTIONNAIRE - PHQ9
SUM OF ALL RESPONSES TO PHQ9 QUESTIONS 1 AND 2: 0
2. FEELING DOWN, DEPRESSED OR HOPELESS: NOT AT ALL
SUM OF ALL RESPONSES TO PHQ9 QUESTIONS 1 AND 2: 0
CLINICAL INTERPRETATION OF PHQ2 SCORE: NO FURTHER SCREENING NEEDED
1. LITTLE INTEREST OR PLEASURE IN DOING THINGS: NOT AT ALL

## 2023-01-21 LAB — HOLD SPECIMEN: NORMAL

## 2023-01-25 LAB
CASE RPRT: NORMAL
CLINICAL INFO: NORMAL
CYTOLOGY CVX/VAG STUDY: NORMAL
PAP EDUCATIONAL NOTE: NORMAL
SPECIMEN ADEQUACY: NORMAL

## 2023-02-01 DIAGNOSIS — M54.16 LUMBAR RADICULOPATHY: Primary | ICD-10-CM

## 2023-02-08 ENCOUNTER — HOSPITAL ENCOUNTER (OUTPATIENT)
Dept: MRI IMAGING | Age: 68
Discharge: HOME | End: 2023-02-08
Attending: PHYSICIAN ASSISTANT
Payer: MEDICARE

## 2023-02-08 DIAGNOSIS — M54.16 LUMBAR RADICULOPATHY: ICD-10-CM

## 2023-02-08 PROCEDURE — 72148 MRI LUMBAR SPINE W/O DYE: CPT | Performed by: PHYSICIAN ASSISTANT

## 2023-02-16 ENCOUNTER — HOSPITAL ENCOUNTER (OUTPATIENT)
Dept: MAMMOGRAPHY | Age: 68
Discharge: HOME | End: 2023-02-16
Attending: OBSTETRICS & GYNECOLOGY
Payer: MEDICARE

## 2023-02-16 DIAGNOSIS — Z12.31 SCREENING MAMMOGRAM FOR BREAST CANCER: ICD-10-CM

## 2023-02-16 PROCEDURE — 77063 BREAST TOMOSYNTHESIS BI: CPT | Performed by: OBSTETRICS & GYNECOLOGY

## 2023-02-16 PROCEDURE — 77067 SCR MAMMO BI INCL CAD: CPT | Performed by: OBSTETRICS & GYNECOLOGY

## 2023-02-17 ENCOUNTER — EXTERNAL RECORD (OUTPATIENT)
Dept: HEALTH INFORMATION MANAGEMENT | Facility: OTHER | Age: 68
End: 2023-02-17

## 2023-02-17 ENCOUNTER — TELEPHONE (OUTPATIENT)
Dept: OBGYN | Age: 68
End: 2023-02-17

## 2023-03-01 ENCOUNTER — HOSPITAL ENCOUNTER (OUTPATIENT)
Dept: MAMMOGRAPHY | Facility: HOSPITAL | Age: 68
Discharge: HOME | End: 2023-03-01
Attending: OBSTETRICS & GYNECOLOGY
Payer: MEDICARE

## 2023-03-01 DIAGNOSIS — R92.8 ABNORMAL MAMMOGRAM: ICD-10-CM

## 2023-03-01 PROCEDURE — 77065 DX MAMMO INCL CAD UNI: CPT | Performed by: OBSTETRICS & GYNECOLOGY

## 2023-03-01 PROCEDURE — 77061 BREAST TOMOSYNTHESIS UNI: CPT | Performed by: OBSTETRICS & GYNECOLOGY

## 2023-03-02 ENCOUNTER — TELEPHONE (OUTPATIENT)
Dept: OBGYN | Age: 68
End: 2023-03-02

## 2024-05-30 NOTE — TELEPHONE ENCOUNTER
Please advise - calledpatient margot jose she has mild cough and runny nose for 5 weeks - getting worse the past 2 days. Yesterday she had fever of 102.  She also had UTI which she is taking antibiotics for and fever is gone, denies SOB  , traveled to [facility] locker [facility]

## 2024-06-17 ENCOUNTER — TELEPHONE (OUTPATIENT)
Facility: CLINIC | Age: 69
End: 2024-06-17

## 2024-06-17 DIAGNOSIS — Z86.010 PERSONAL HISTORY OF COLONIC POLYPS: Primary | ICD-10-CM

## 2024-06-18 NOTE — TELEPHONE ENCOUNTER
Called patient for CLN recall, date of birth and name verified.  Medications, pharmacy, and allergies verified   Please advise on colonoscopy and bowel prep orders.   › Insurance:  MEDICARE  › Last PCP Visit: 10/13/2023  › Last CBC drawn: 5/16/2024  › H/W/BMI: 5'5/142 lbs/ 23.63    Special comments/notes:  Recall    Last Procedure, Date, MD:    10/29/2021 CLN by Dr GRANGER   Last diagnosis:  Fragments of tubular adenoma, separate fragments of sessile serrated adenoma   Recalled for (mth/yrs):  3 years (October 2024)   Sedation used previously:  MAC   Last Prep Used:  trilyte   Quality of Prep:  good     Telephone Colon Screening Questionnaire Yes No   Are you currently experiencing any new/abnormal GI symptoms? [] [x]   If yes, explain:     Rectal bleeding? [] [x]   Black stool? [] [x]   Dysphagia or food \"feeling stuck\" when eating? [] [x]   Intractable vomiting? [] [x]   Unexplained weight loss? [] [x]   First colonoscopy? [] [x]   Family history of colon cancer? Maternal grandmother had colon cancer [x] []   Any issues with anesthesia? [] [x]   If yes, explain:      Have you had a stroke, heart attack or stent placement in the last 12 months?  [] [x]   If yes ? GI in-person consultation      Personal history of resp. Issues/oxygen use/ANIRUDH/COPD: Sleep apnea [x] []   If yes, CPAP/BiPAP? No, using oral device     History of devices (pacemaker/defibrillator) [] [x]   History of cardiac/CVA issues/(MI/stroke) (see above) [] [x]     Medications Yes  No   Anticoagulants  Anticoagulant (Except Aspirin) ? route to RN pool to request adjustments from prescribing provider  [] [x]   Diabetic Meds  PO ? hold DAY PRIOR and DAY OF procedure  Insulin ? route to RN pool to request adjustments from prescribing provider  [] [x]   Weight loss meds (Phentermine/Vyvanse/Saxsenda/etc): [] [x]   Iron/herbal/multivitamin supplement (RX/OTC): calcium,probiotic,CO Q10,glucosamine,Vit D3 [x] []   Usage of marijuana, CBD &/or vape products: []  [x]        Tripp Peraza MD   Physician  Specialty: GASTROENTEROLOGY     Procedures     Signed     Encounter Date: 10/29/2021     Signed         [facility] Endoscopy Report     Date of Procedure:  10/29/21     Preoperative Diagnosis:  1.  Colorectal cancer screening  2.  Personal history of sigmoid colon diverticulitis     Postoperative Diagnosis:  1.  Colon polyps  2.  Diverticulosis left colon with associated granulation tissue     Procedure:    Colonoscopy with polypectomy     Surgeon:  Tripp Peraza M.D.     Anesthesia:  Monitored anesthesia care  Cecal withdrawal time: 23 minutes  EBL:  Insignificant     Brief History:  This is a 66 year old female who presents for a screening colonoscopy.  Her last examination was in 2009.  She had an episode of sigmoid colon diverticulitis in July 2021 which was CT documented presenting with abdominal pain and fever.  She has responded to oral antibiotics and is currently asymptomatic.     Technique:  After informed consent, the patient was placed in the left lateral recumbent position.  Digital rectal examination revealed no palpable intraluminal abnormalities.  An Olympus variable stiffness 190 series HD colonoscope was inserted into the rectum and advanced under direct vision by following the lumen to the sigmoid colon.  The sigmoid was somewhat angulated making advancement of the adult endoscope difficult.  This was removed and a variable stiffness HD pediatric colonoscope inserted in a similar fashion.  This instrument was advanced to the terminal ileum.  The colon was examined upon withdrawal in the left lateral recumbent position.       Findings:  The preparation of the colon was good.  The terminal ileum was examined for 5 cm and visually normal.  The ileocecal valve was well preserved.  The visualized colonic mucosa from the cecum to the anal verge was normal with an intact vascular pattern.  There were #3 polyps seen within  the colon which were removed as follows:     1.  In the cecum there were #2 polyps measuring 5-6 mm each in size.  These were cold snare excised and retrieved.  2.  In the ascending colon there was a 4-5 mm sessile polyp which was cold snare excised and retrieved.     Inspection of all sites revealed no evidence of ongoing bleeding.  There was an occasional diverticulum seen in the descending colon and multiple diverticula in the sigmoid including #1 associated with granulation tissue.  This was removed with a combination of a cold snare and cold biopsy forceps.  There were no other colonic polyps, mass lesions, vascular anomalies or signs of inflammation seen.  Retroflexion in the rectum revealed incidental internal hemorrhoids.  The procedure was well tolerated without immediate complication.     Impression:  1.  Colon polyps  2.  Diverticulosis left colon with associated granulation tissue  3.  Pediatric colonoscope required     Recommendations:  1.  High-fiber diet.  2.  Follow-up biopsy results.  Polyp histology to determine recommendations regarding follow-up.                   Tripp Peraza MD  11/2/2021  6:32 PM CDT       I spoke to the patient. She is feeling well. She had #3 adenomatous polyps removed (traditional and serrated). I have discussed the significance. The sigmoid biopsy was consistent with granulation tissue as expected. I would not treat in the absence of symptoms. I have recommended a high-fiber diet for diverticulosis and a surveillance colonoscopy in 3 years.    GI RNs: Please enter colonoscopy recall for 3 years.  GABY Morales.          Final Diagnosis:      A. Cecum polyps; biopsy:  Fragments of tubular adenoma  Separate fragments of sessile serrated adenoma     B. Ascending colon polyp; biopsy:  Fragments of tubular adenoma     C. Sigmoid colon granulation; biopsy:  Fragments of benign colonic mucosa and granulation tissue with associated acute and chronic inflammation  No glandular  distortion or granuloma identified

## 2024-06-19 RX ORDER — POLYETHYLENE GLYCOL 3350, SODIUM CHLORIDE, SODIUM BICARBONATE, POTASSIUM CHLORIDE 420; 11.2; 5.72; 1.48 G/4L; G/4L; G/4L; G/4L
POWDER, FOR SOLUTION ORAL
Qty: 1 EACH | Refills: 0 | Status: SHIPPED | OUTPATIENT
Start: 2024-06-19

## 2024-06-19 RX ORDER — TURMERIC ROOT EXTRACT 500 MG
1 TABLET ORAL DAILY
COMMUNITY

## 2024-06-19 RX ORDER — ATORVASTATIN CALCIUM 20 MG/1
20 TABLET, FILM COATED ORAL NIGHTLY
COMMUNITY

## 2024-06-19 NOTE — TELEPHONE ENCOUNTER
Patient is returning the schedulers call to schedule her procedure. I tried to reach, but not available.

## 2024-06-19 NOTE — TELEPHONE ENCOUNTER
May schedule a colonoscopy for a personal history of colon polyps following a split dose TriLyte preparation monitored anesthesia care.

## 2024-06-20 NOTE — TELEPHONE ENCOUNTER
Scheduled for:  Colonoscopy [id number]  Provider Name:  Dr. Peraza  Date:  11/8/2024  Location:  [facility]  Sedation:  MAC  Time:  9am, pt is aware eosc will call with arrival   Prep: Trilyte   Meds/Allergies Reconciled?:  Physician reviewed     Diagnosis with codes:  Hx of colon polyps Z86.010  Was patient informed to call insurance with codes (Y/N):  Yes, I confirmed MEDICARE insurance with this patient.      Referral sent?:  Referral was sent at the time of electronic surgical scheduling.   EM or [facility] notified?:  I sent an electronic request to Endo Scheduling and received a confirmation today.      Medication Orders:  n/a  Misc Orders:  n/a     Further instructions given by staff:   I discussed the prep instructions with the patient which she verbally understood and is aware that I will mail the instructions today.

## 2024-09-11 ENCOUNTER — TELEPHONE (OUTPATIENT)
Dept: OBGYN | Age: 69
End: 2024-09-11

## 2024-09-11 DIAGNOSIS — Z78.0 ASYMPTOMATIC MENOPAUSAL STATE: Primary | ICD-10-CM

## 2024-09-25 ENCOUNTER — TELEPHONE (OUTPATIENT)
Dept: OBGYN | Age: 69
End: 2024-09-25

## 2024-09-25 ENCOUNTER — HOSPITAL ENCOUNTER (OUTPATIENT)
Dept: BONE DENSITY | Age: 69
Discharge: HOME | End: 2024-09-25
Attending: OBSTETRICS & GYNECOLOGY
Payer: MEDICARE

## 2024-09-25 DIAGNOSIS — Z78.0 ASYMPTOMATIC MENOPAUSAL STATE: ICD-10-CM

## 2024-09-25 PROCEDURE — 77080 DXA BONE DENSITY AXIAL: CPT | Performed by: OBSTETRICS & GYNECOLOGY

## 2024-10-31 ENCOUNTER — TELEPHONE (OUTPATIENT)
Facility: CLINIC | Age: 69
End: 2024-10-31

## 2024-10-31 NOTE — TELEPHONE ENCOUNTER
We typically do not recommend antibiotics in patients that have had joint replacements prior to colonoscopy, however, if the patient's orthopedic surgeon wishes her to do so she may take the amoxicillin about 2 hours before the procedure with a small sip of water.

## 2024-10-31 NOTE — TELEPHONE ENCOUNTER
Patient contacted,date of birth verified and message from Dr. Peraza given.  Patient voiced understanding.

## 2024-10-31 NOTE — TELEPHONE ENCOUNTER
Patient contacted and states she had 2 knee replacements done and is letting you know that she is  planning on taking Amoxicillin 500 mg #4 prior to her procedure.  Thank you.   No

## 2024-11-06 ENCOUNTER — TELEPHONE (OUTPATIENT)
Facility: CLINIC | Age: 69
End: 2024-11-06

## 2024-11-06 NOTE — TELEPHONE ENCOUNTER
Patient could not find prep instruction- went over them verbally but also will resend via [employer].     Patient  prescribed Amoxicillin 500mg - tablets four tables four housr prior to procedure due to joint replacement by her orthopodic Dr. Ruiz wanted to make sure Dr. Chavez was aware and okay with that.

## 2024-11-07 ENCOUNTER — TELEPHONE (OUTPATIENT)
Facility: CLINIC | Age: 69
End: 2024-11-07

## 2024-11-07 NOTE — TELEPHONE ENCOUNTER
Patient calling states she followed all instructions for procedure but missed in regards eating nuts 5 days prior to procedure. Please call.

## 2024-11-07 NOTE — TELEPHONE ENCOUNTER
The preparation will likely be fine, however, I cannot be certain until the procedure is performed.  Patient preference if she wishes to proceed or reschedule.

## 2024-11-07 NOTE — TELEPHONE ENCOUNTER
Patient states she did not look at her prep instructions closely till last night and realized she has been eating a handful of nuts every night for the last 5 days and is scheduled for a colonoscopy tomorrow.  Patient asking if she can proceed with procedure.  Please advise.Thank you.

## 2024-11-07 NOTE — TELEPHONE ENCOUNTER
Patient contacted,date of birth verified and message from Dr. Peraza given.  Patient states she would like to proceed.

## 2024-11-08 NOTE — TELEPHONE ENCOUNTER
Okay to use the amoxicillin as directed by Dr. Ruiz.  The patient should take the antibiotic with only a small sip of water.

## 2024-11-13 ENCOUNTER — TELEPHONE (OUTPATIENT)
Facility: CLINIC | Age: 69
End: 2024-11-13

## 2024-11-14 NOTE — TELEPHONE ENCOUNTER
Health maintenance updated.     Last colonoscopy done 11/8/2024 by Dr Peraza    No recall per Dr Peraza.

## 2024-11-14 NOTE — TELEPHONE ENCOUNTER
Tripp Peraza MD  11/13/2024  5:57 PM CST Back to Top      I spoke to the patient.  She is feeling well.  Her polyp was not adenomatous.  As she only had #3 subcentimeter adenomatous polyps previously and no adenomas on the current colonoscopy at the age of almost 70, I would not recommend further surveillance unless any new symptoms or signs are noted.  I have recommended a high-fiber diet for diverticulosis.     GI RNs: Please enter in health maintenance that a colonoscopy was performed.  No recall.

## 2025-02-26 ENCOUNTER — HOSPITAL ENCOUNTER (OUTPATIENT)
Dept: MAMMOGRAPHY | Age: 70
Discharge: HOME | End: 2025-02-26
Attending: INTERNAL MEDICINE
Payer: MEDICARE

## 2025-02-26 DIAGNOSIS — Z12.31 ENCOUNTER FOR SCREENING MAMMOGRAM FOR MALIGNANT NEOPLASM OF BREAST: ICD-10-CM

## 2025-02-26 PROCEDURE — 77067 SCR MAMMO BI INCL CAD: CPT | Performed by: INTERNAL MEDICINE

## 2025-02-26 PROCEDURE — 77063 BREAST TOMOSYNTHESIS BI: CPT | Performed by: INTERNAL MEDICINE

## 2025-05-07 ENCOUNTER — OFFICE VISIT (OUTPATIENT)
Dept: NEUROLOGY | Facility: CLINIC | Age: 70
End: 2025-05-07
Payer: MEDICARE

## 2025-05-07 VITALS
WEIGHT: 142 LBS | DIASTOLIC BLOOD PRESSURE: 80 MMHG | SYSTOLIC BLOOD PRESSURE: 132 MMHG | BODY MASS INDEX: 23.37 KG/M2 | HEIGHT: 65.5 IN

## 2025-05-07 DIAGNOSIS — R42 EPISODIC LIGHTHEADEDNESS: Primary | ICD-10-CM

## 2025-05-07 DIAGNOSIS — R26.89 BALANCE PROBLEM: ICD-10-CM

## 2025-05-07 PROCEDURE — 99203 OFFICE O/P NEW LOW 30 MIN: CPT | Performed by: INTERNAL MEDICINE

## 2025-05-07 PROCEDURE — G2211 COMPLEX E/M VISIT ADD ON: HCPCS | Performed by: INTERNAL MEDICINE

## 2025-05-07 NOTE — PROGRESS NOTES
[address], [address]  [facility] [address]  [phone]            Neurology Initial Visit     Referred By: Dr. Aaron ref. provider found    Chief Complaint:   Chief Complaint   Patient presents with    New Patient     New pt presenting for dizziness/lightheadedness intermittently (once monthly) for a few years, pt also complains of brain fog. Admits N/T in L toes, denies weakness, denies nausea. Pt reports these episodes are not associated with exertion, does not know if they're happening in a fasted state or fed state.      History of Present Illness  Mrs. Alisson De Santiago is a 70 year old female who presents with episodes of dizziness and lightheadedness.    She has been experiencing episodes of dizziness and lightheadedness for the past couple of years, occurring approximately once a month. These episodes are characterized by a sensation of impending fainting, although she has never lost consciousness. They last for 5 to 10 seconds and resolve without intervention, allowing her to continue her activities. The episodes are not associated with changes in position, physical exertion, or temperature changes. No palpitations or chest pain are associated with these episodes.    She has undergone a cardiac evaluation, including wearing a heart monitor for a week, which revealed an extra or irregular heartbeat. However, the episodes of dizziness do not appear to correlate with the heart monitor findings.    She reports a history of poor balance, particularly on her left side, but denies any vertigo or spinning sensations. She experiences numbness in her left distal toes, which she attributes to an orthopedic issue with her ankle. This numbness has been present for a couple of years and is not painful.    Her current medications include blood pressure medications, calcium, estradiol, and a statin for cholesterol management.    She recently returned from a  trip to [facility], where she engaged in physical activities such as climbing hills without experiencing any episodes of dizziness.    During the review of symptoms, she denies any numbness or tingling in the feet, except for the noted numbness in her left toes. She also reports some bilateral ringing in the ears. No palpitations, chest pain, or vertigo.        Past Medical History[1]    Past Surgical History[2]    Social history:  History   Smoking Status    Never   Smokeless Tobacco    Never     History   Alcohol Use    6.0 standard drinks of alcohol/week    6 Standard drinks or equivalent per week     Comment: occ.      History   Drug Use No       Family History[3]    Medications - Current[4]    Allergies[5]    ROS:   As in HPI, the rest of the 14 system review was done and was negative      Physical Exam:  Vitals:    05/07/25 1032   BP: 132/80   Weight: 142 lb (64.4 kg)   Height: 65.5\"       General: No apparent distress, well nourished, well groomed.  Head- Normocephalic, atraumatic  Eyes- No redness or swelling    Neurological:   Mental Status:  Mental Status- Alert, conversant, speech fluent, following all commands    Cranial Nerves:  II.- Visual fields full to confrontation, III, IV, VI- EOM intact, and VII. Face symmetric, no facial weakness    Motor Exam:  Strength- upper extremities 5/5 proximally and distally                - lower  extremities 5/5 proximally and distally  No bradykinesia, no cogwheel rigidity, no rest tremor      Sensory Exam:  Pinprick- intact in all 4 extremities  Vibration- intact in all 4 extremities    Deep Tendon Reflexes:  Biceps 2+ bilateral symmetric  Triceps 2+ bilateral symmetric  Brachioradialis 2 + bilateral symmetric  Patellar 2+ bilateral symmetric  Ankle jerks 2+ bilateral symmetric    Coordination:  No dysmetria with finger to nose bilaterally    Gait:  Normal casual gait    Labs:    Lab Results   Component Value Date    TSH 1.950 07/10/2020     Lab Results    Component Value Date    HDL 54 05/21/2021     (H) 05/21/2021    TRIG 95 05/21/2021     Lab Results   Component Value Date    HGB 11.9 (L) 07/28/2021    HCT 36.3 07/28/2021    MCV 93.8 07/28/2021    WBC 11.3 (H) 07/28/2021    .0 07/28/2021      Lab Results   Component Value Date    BUN 19 (H) 07/28/2021    CA 10.0 07/28/2021    ALT 17 09/03/2020    AST 17 09/03/2020    ALKPHOS 65 03/25/2016    ALB 3.4 09/03/2020     07/28/2021    K 3.5 07/28/2021     07/28/2021    CO2 27.0 07/28/2021      I have reviewed labs.    Imaging Studies:  I have independently reviewed imaging.    Assessment & Plan  Dizziness and lightheadedness  Episodic dizziness and lightheadedness with standing, resolves after a few seconds with pausing.  History and exam do not show any abnormality to suggest peripheral or central dysautonomia. Likely related to systemic issues like dehydration, nutrition.   - Monitor for symptom progression.  - Track patterns and triggers of dizziness.    Impaired balance  No evidence of neuropathy, reflexes and sensation intact. Balance issues possibly related to orthopedic condition and aging.  - Consider physical therapy for balance improvement.  - Engage in balance-promoting exercises, such as yoga.  - Vitamin B12 normal in Jan, consider rechecking Vit D levels with annual labs.         Education and counseling provided to patient. Instructed patient to call my office or seek medical attention immediately if symptoms worsen.  Patient verbalized understanding of information given. All questions were answered. All side effects of drugs were discussed.     Return to clinic in: No follow-ups on file.    Rachelle Saravia MD         [1]   Past Medical History:   Asthma (HCC)    mother    Emphysema lung (HCC)    mother    Hearing impairment    has hearing aids    High blood pressure    High cholesterol    Lung nodule    Osteoarthrosis, unspecified whether generalized or localized, unspecified site     Osteopenia    Renal disorder    Sleep apnea    oral device    Unspecified essential hypertension    Uses hearing aid    Visual impairment    wears contact lens   [2]   Past Surgical History:  Procedure Laterality Date    Colonoscopy  12/09, 10/03    Dr. Ross - emily, grandmother with colon CA    Colonoscopy N/A 11/8/2024    Procedure: COLONOSCOPY;  Surgeon: Tripp Peraza MD;  Location: [facility] MAIN OR    Cyst aspiration left Left     date unknown    Electrocardiogram, complete      Scanned to media tab - DOS 02-    Knee replacement surgery Left     Total knee replacement Left 2005    Total knee replacement Left 2017    revision    Total knee replacement Right 11/11/2019   [3]   Family History  Problem Relation Age of Onset    Heart Disorder Father     Other (MDS) Brother 69    Psychiatric Brother         depression    Cancer Brother 68        bladder    Breast Cancer Neg     Ovarian Cancer Neg    [4]   Current Outpatient Medications:     amoxicillin 500 MG Oral Cap, Take 2,000 mg by mouth one time., Disp: , Rfl:     PEG 3350-KCl-Na Bicarb-NaCl (TRILYTE) 420 g Oral Recon Soln, Split dose. Please follow [facility] bowel prep instructions., Disp: 1 each, Rfl: 0    atorvastatin 20 MG Oral Tab, Take 1 tablet (20 mg total) by mouth nightly., Disp: , Rfl:     B Complex Vitamins (B COMPLEX 1 OR), Take 1 capsule by mouth daily., Disp: , Rfl:     Turmeric 500 MG Oral Tab, Take 1 tablet by mouth daily., Disp: , Rfl:     TRIAMTERENE-HYDROCHLOROTHIAZIDE 37.5-25 MG Oral Cap, TAKE 1 CAPSULE BY MOUTH EVERY DAY, Disp: 90 capsule, Rfl: 0    LOSARTAN 50 MG Oral Tab, TAKE 1 TABLET BY MOUTH EVERY DAY, Disp: 90 tablet, Rfl: 0    amLODIPine 5 MG Oral Tab, Take 1 tablet (5 mg total) by mouth daily., Disp: 90 tablet, Rfl: 0    estradiol 0.1 MG/GM Vaginal Cream, APPLY PEA SIZE AMOUNT TO EXTERNAL VAGINAL INTROITUS AND AROUND URETHRAL OPENING EVERY OTHER DAY, Disp: , Rfl:     Cholecalciferol (D3) 50 MCG (2000 UT) Oral  Tab, Take 1 tablet (2,000 Units total) by mouth daily., Disp: , Rfl:     Probiotic Product (PROBIOTIC-10 ULTIMATE) Oral Cap, Take 1 capsule by mouth daily., Disp: , Rfl:     saccharomyces boulardii 250 MG Oral Cap, Digest Probiotic (S.boulardii) 250 mg capsule  TAKE 1 CAPSULE (250 MG TOTAL) BY MOUTH 2 (TWO) TIMES DAILY FOR 14 DAYS. (Patient not taking: Reported on 6/19/2024), Disp: , Rfl:     Calcium Carb-Cholecalciferol (CALTRATE 600+D3 OR), Take 1 tablet by mouth daily., Disp: , Rfl:     triamcinolone 0.5 % External Cream, Use bid as directed, Disp: 60 g, Rfl: 1    alendronate 70 MG Oral Tab, Take 1 tablet by mouth once a week. (Patient not taking: Reported on 6/19/2024), Disp: , Rfl: 3    Glucosamine-Chondroitin 500-400 MG Oral Cap, Take 1 tablet by mouth 2 (two) times daily., Disp: , Rfl:     Coenzyme Q10 (COQ10 OR), Take 1 tablet by mouth daily.  , Disp: , Rfl:   [5] No Known Allergies

## 2026-01-05 ENCOUNTER — APPOINTMENT (OUTPATIENT)
Age: 71
End: 2026-01-05

## (undated) DEVICE — POLAR CARE CUBE COOLING UNIT

## (undated) DEVICE — T5 HOOD WITH PEEL AWAY FACE SHIELD

## (undated) DEVICE — GAMMEX® PI HYBRID SIZE 7.5, STERILE POWDER-FREE SURGICAL GLOVE, POLYISOPRENE AND NEOPRENE BLEND: Brand: GAMMEX

## (undated) DEVICE — BATTERY

## (undated) DEVICE — GAMMEX® PI HYBRID SIZE 8, STERILE POWDER-FREE SURGICAL GLOVE, POLYISOPRENE AND NEOPRENE BLEND: Brand: GAMMEX

## (undated) DEVICE — TOTAL KNEE: Brand: MEDLINE INDUSTRIES, INC.

## (undated) DEVICE — GAMMEX® PI HYBRID SIZE 6.5, STERILE POWDER-FREE SURGICAL GLOVE, POLYISOPRENE AND NEOPRENE BLEND: Brand: GAMMEX

## (undated) DEVICE — DRAPE SRG 70X60IN SPLT U IMPRV

## (undated) DEVICE — PAD THRP 16IN WRPON MU LNG STM

## (undated) DEVICE — 60 ML SYRINGE,TOOMEY TYPE: Brand: MONOJECT

## (undated) DEVICE — DRAPE SHEET LG

## (undated) DEVICE — CHLORAPREP 26ML APPLICATOR

## (undated) DEVICE — DRESSING AQUACEL AG SP 3.5X10

## (undated) DEVICE — BOWL CEMENT MIX QUICK-VAC

## (undated) DEVICE — GAUZE SPONGES,12 PLY: Brand: CURITY

## (undated) DEVICE — DERMABOND LIQUID ADHESIVE

## (undated) DEVICE — Device

## (undated) DEVICE — NEEDLE HPO 18GA 1.5IN ECLPS

## (undated) DEVICE — Device: Brand: STABLECUT®

## (undated) DEVICE — ANTIBACTERIAL VIOLET BRAIDED (POLYGLACTIN 910), SYNTHETIC ABSORBABLE SUTURE: Brand: COATED VICRYL

## (undated) DEVICE — SOL  .9 1000ML BTL

## (undated) DEVICE — 60 ML SYRINGE LUER-LOCK TIP: Brand: MONOJECT

## (undated) DEVICE — 50MM MG 2.5 HEX HEAD PIN-DJ

## (undated) DEVICE — 3M™ IOBAN™ 2 ANTIMICROBIAL INCISE DRAPE 6650EZ: Brand: IOBAN™ 2

## (undated) DEVICE — STANDARD HYPODERMIC NEEDLE,POLYPROPYLENE HUB: Brand: MONOJECT

## (undated) DEVICE — CONTAINER SPEC STR 4OZ GRY LID

## (undated) DEVICE — SOL  .9 3000ML

## (undated) DEVICE — SUTURE MONOCRYL 3-0 Y936H

## (undated) DEVICE — SYRINGE MNJCT 35ML LF STRL LL

## (undated) DEVICE — COTTON UNDERCAST PADDING,REGULAR FINISH: Brand: WEBRIL

## (undated) DEVICE — 33MM MG 2.5 HEX HEAD PIN-DJ

## (undated) DEVICE — TOURNIQUET CUFF 34 STR

## (undated) DEVICE — ANTIBACTERIAL UNDYED BRAIDED (POLYGLACTIN 910), SYNTHETIC ABSORBABLE SUTURE: Brand: COATED VICRYL

## (undated) NOTE — LETTER
Hospital Discharge Documentation  Please phone to schedule a hospital follow up appointment.     From: [address] [facility] Office  Phone: [phone]    Patient discharged time/date: 11/12/2019 12:37 PM  Patient discharge disposition:  Home or Jewell MD YUMIKO on 11/11/2019 at 16:31     Approved by (CST): Jarvis Barnard MD on 11/11/2019 at 16:31            Discharge Instructions     Medication List      START taking these medications    Acetaminophen 500 MG Caps  Take 1-2 capsules (500-1,000 mg total) by m TAKE 1 CAPSULE BY MOUTH EVERY DAY        STOP taking these medications    amoxicillin 500 MG Caps  Commonly known as:  AMOXIL           Where to Get Your Medications      You can get these medications from any pharmacy    Bring a paper prescription for eac

## (undated) NOTE — MR AVS SNAPSHOT
[facility] [address] [address]  [phone]               Thank you for choosing us for your health care visit with Liudmila Rosas MD.  We are glad to serve you and happy to provide you with this summary of your visit.   Pl Take 1 tablet by mouth daily. [facility] CALCIUM+VIT D 500-125 MG-UNIT Tabs   Generic drug:  Calcium Carb-Cholecalciferol   Take 1 tablet by mouth 2 (two) times daily.            Diflorasone Diacetate 0.05 % Crea   Apply topically 3 (three)